# Patient Record
Sex: MALE | Race: WHITE | NOT HISPANIC OR LATINO | Employment: FULL TIME | URBAN - METROPOLITAN AREA
[De-identification: names, ages, dates, MRNs, and addresses within clinical notes are randomized per-mention and may not be internally consistent; named-entity substitution may affect disease eponyms.]

---

## 2019-02-22 ENCOUNTER — HOSPITAL ENCOUNTER (OUTPATIENT)
Facility: MEDICAL CENTER | Age: 59
End: 2019-02-22
Attending: PREVENTIVE MEDICINE
Payer: COMMERCIAL

## 2019-02-22 ENCOUNTER — EH NON-PROVIDER (OUTPATIENT)
Dept: OCCUPATIONAL MEDICINE | Facility: CLINIC | Age: 59
End: 2019-02-22

## 2019-02-22 ENCOUNTER — EMPLOYEE HEALTH (OUTPATIENT)
Dept: OCCUPATIONAL MEDICINE | Facility: CLINIC | Age: 59
End: 2019-02-22

## 2019-02-22 VITALS
RESPIRATION RATE: 14 BRPM | WEIGHT: 252 LBS | DIASTOLIC BLOOD PRESSURE: 86 MMHG | HEART RATE: 75 BPM | OXYGEN SATURATION: 97 % | SYSTOLIC BLOOD PRESSURE: 132 MMHG | HEIGHT: 74 IN | BODY MASS INDEX: 32.34 KG/M2 | TEMPERATURE: 97.7 F

## 2019-02-22 VITALS
DIASTOLIC BLOOD PRESSURE: 86 MMHG | HEART RATE: 75 BPM | OXYGEN SATURATION: 97 % | WEIGHT: 252 LBS | BODY MASS INDEX: 32.34 KG/M2 | TEMPERATURE: 97.7 F | SYSTOLIC BLOOD PRESSURE: 132 MMHG | RESPIRATION RATE: 14 BRPM | HEIGHT: 74 IN

## 2019-02-22 DIAGNOSIS — Z02.89 ENCOUNTER FOR OCCUPATIONAL HEALTH EXAMINATION: ICD-10-CM

## 2019-02-22 DIAGNOSIS — Z02.1 PRE-EMPLOYMENT DRUG SCREENING: ICD-10-CM

## 2019-02-22 LAB
AMP AMPHETAMINE: NORMAL
BAR BARBITURATES: NORMAL
BZO BENZODIAZEPINES: NORMAL
COC COCAINE: NORMAL
INT CON NEG: NORMAL
INT CON POS: NORMAL
MDMA ECSTASY: NORMAL
MET METHAMPHETAMINES: NORMAL
MTD METHADONE: NORMAL
OPI OPIATES: NORMAL
OXY OXYCODONE: NORMAL
PCP PHENCYCLIDINE: NORMAL
POC URINE DRUG SCREEN OCDRS: NEGATIVE
THC: NORMAL

## 2019-02-22 PROCEDURE — 86735 MUMPS ANTIBODY: CPT | Performed by: PREVENTIVE MEDICINE

## 2019-02-22 PROCEDURE — 86762 RUBELLA ANTIBODY: CPT | Performed by: PREVENTIVE MEDICINE

## 2019-02-22 PROCEDURE — 86787 VARICELLA-ZOSTER ANTIBODY: CPT | Performed by: PREVENTIVE MEDICINE

## 2019-02-22 PROCEDURE — 86765 RUBEOLA ANTIBODY: CPT | Performed by: PREVENTIVE MEDICINE

## 2019-02-22 PROCEDURE — 8915 PR COMPREHENSIVE PHYSICAL: Performed by: PREVENTIVE MEDICINE

## 2019-02-22 PROCEDURE — 80305 DRUG TEST PRSMV DIR OPT OBS: CPT | Performed by: PREVENTIVE MEDICINE

## 2019-02-22 PROCEDURE — 86480 TB TEST CELL IMMUN MEASURE: CPT | Performed by: PREVENTIVE MEDICINE

## 2019-02-22 RX ORDER — LISINOPRIL 5 MG/1
5 TABLET ORAL
COMMUNITY

## 2019-02-22 RX ORDER — ATORVASTATIN CALCIUM 20 MG/1
20 TABLET, FILM COATED ORAL
COMMUNITY
End: 2019-07-19 | Stop reason: SDUPTHER

## 2019-02-22 RX ORDER — ALLOPURINOL 300 MG/1
300 TABLET ORAL
COMMUNITY

## 2019-02-23 LAB
MEV IGG SER IA-ACNC: 2.51
MUV IGG SER IA-ACNC: 2.95
RUBV AB SER QL: >500 IU/ML
VZV IGG SER IA-ACNC: 2

## 2019-02-25 LAB
GAMMA INTERFERON BACKGROUND BLD IA-ACNC: 0.02 IU/ML
M TB IFN-G BLD-IMP: NEGATIVE
M TB IFN-G CD4+ BCKGRND COR BLD-ACNC: 0 IU/ML
MITOGEN IGNF BCKGRD COR BLD-ACNC: >10 IU/ML
QFT TB2 - NIL TBQ2: 0.01 IU/ML

## 2019-02-27 ENCOUNTER — TELEPHONE (OUTPATIENT)
Dept: OCCUPATIONAL MEDICINE | Facility: CLINIC | Age: 59
End: 2019-02-27

## 2019-03-05 ENCOUNTER — TELEPHONE (OUTPATIENT)
Dept: OCCUPATIONAL MEDICINE | Facility: CLINIC | Age: 59
End: 2019-03-05

## 2019-03-07 ENCOUNTER — TELEPHONE (OUTPATIENT)
Dept: OCCUPATIONAL MEDICINE | Facility: CLINIC | Age: 59
End: 2019-03-07

## 2019-06-18 ENCOUNTER — OFFICE VISIT (OUTPATIENT)
Dept: MEDICAL GROUP | Facility: MEDICAL CENTER | Age: 59
End: 2019-06-18
Payer: COMMERCIAL

## 2019-06-18 VITALS
DIASTOLIC BLOOD PRESSURE: 60 MMHG | OXYGEN SATURATION: 96 % | BODY MASS INDEX: 32.26 KG/M2 | WEIGHT: 251.4 LBS | HEART RATE: 68 BPM | TEMPERATURE: 98.4 F | SYSTOLIC BLOOD PRESSURE: 98 MMHG | HEIGHT: 74 IN

## 2019-06-18 DIAGNOSIS — F33.42 RECURRENT MAJOR DEPRESSIVE DISORDER, IN FULL REMISSION (HCC): ICD-10-CM

## 2019-06-18 DIAGNOSIS — L71.9 ROSACEA: ICD-10-CM

## 2019-06-18 DIAGNOSIS — E11.69 DIABETES MELLITUS TYPE 2 IN OBESE (HCC): ICD-10-CM

## 2019-06-18 DIAGNOSIS — I10 ESSENTIAL HYPERTENSION: ICD-10-CM

## 2019-06-18 DIAGNOSIS — G47.10 EXCESSIVE SLEEPINESS: ICD-10-CM

## 2019-06-18 DIAGNOSIS — E66.9 DIABETES MELLITUS TYPE 2 IN OBESE (HCC): ICD-10-CM

## 2019-06-18 DIAGNOSIS — M1A.09X0 IDIOPATHIC CHRONIC GOUT OF MULTIPLE SITES WITHOUT TOPHUS: ICD-10-CM

## 2019-06-18 DIAGNOSIS — E66.09 CLASS 1 OBESITY DUE TO EXCESS CALORIES WITHOUT SERIOUS COMORBIDITY WITH BODY MASS INDEX (BMI) OF 32.0 TO 32.9 IN ADULT: ICD-10-CM

## 2019-06-18 PROCEDURE — 99214 OFFICE O/P EST MOD 30 MIN: CPT | Performed by: FAMILY MEDICINE

## 2019-06-18 RX ORDER — CLINDAMYCIN PHOSPHATE 10 MG/G
GEL TOPICAL
Qty: 1 TUBE | Refills: 3 | Status: SHIPPED | OUTPATIENT
Start: 2019-06-18

## 2019-06-18 RX ORDER — BUPROPION HYDROCHLORIDE 100 MG/1
150 TABLET ORAL 2 TIMES DAILY
COMMUNITY

## 2019-06-18 RX ORDER — SERTRALINE HYDROCHLORIDE 100 MG/1
200 TABLET, FILM COATED ORAL DAILY
COMMUNITY

## 2019-06-18 ASSESSMENT — PATIENT HEALTH QUESTIONNAIRE - PHQ9: CLINICAL INTERPRETATION OF PHQ2 SCORE: 0

## 2019-06-18 NOTE — ASSESSMENT & PLAN NOTE
Having a very hard time staying awake at work. He works in a sedentary office job. This has been going on for a couple years, seems to be getting worse recently. +/-  Fatigue, still working out regularly.   Reports about 7-8 hrs of sleep at night. Wakes 1-2 times per night, usually to go the bathroom.   Reports sore, dry throat in the morning  Wife reports a lot of snoring.

## 2019-06-18 NOTE — ASSESSMENT & PLAN NOTE
Chronic problem.  Currently on Lisinopril 5mg.  Blood pressure well controlled, in fact on the low side.  Patient states at home his blood pressure is much higher.

## 2019-06-18 NOTE — ASSESSMENT & PLAN NOTE
The patient is seen by a psychiatrist in [place] once every 4 months. Currently very well controlled on sertraline and wellbutrin.

## 2019-06-20 PROBLEM — M1A.0790 CHRONIC IDIOPATHIC GOUT INVOLVING TOE: Status: ACTIVE | Noted: 2019-06-18

## 2019-06-20 NOTE — PROGRESS NOTES
Subjective:     CC:  Diagnoses of Diabetes mellitus type 2 in obese (HCC), Idiopathic chronic gout of multiple sites without tophus, Essential hypertension, Class 1 obesity due to excess calories without serious comorbidity with body mass index (BMI) of 32.0 to 32.9 in adult, Recurrent major depressive disorder, in full remission ([facility]), Excessive sleepiness, and Rosacea were pertinent to this visit.    HISTORY OF THE PRESENT ILLNESS: Patient is a 59 y.o. male who presents to the clinic today to establish care.  He recently moved here from the [place] area.    Essential hypertension  Chronic problem.  Currently on Lisinopril 5mg.  Blood pressure well controlled, in fact on the low side.  Patient states at home his blood pressure is much higher.    Recurrent major depressive disorder, in full remission ([facility])  The patient is seen by a psychiatrist in [place] once every 4 months. Currently very well controlled on sertraline and wellbutrin.    Excessive sleepiness  Having a very hard time staying awake at work. He works in a sedentary office job. This has been going on for a couple years, seems to be getting worse recently. +/-  Fatigue, still working out regularly.   Reports about 7-8 hrs of sleep at night. Wakes 1-2 times per night, usually to go the bathroom.   Reports sore, dry throat in the morning  Wife reports a lot of snoring.     Chronic idiopathic gout involving toe  Chronic problem.  On allopurinol x 20 yrs. Last gout flare was 3 yrs ago.     Class 1 obesity due to excess calories without serious comorbidity with body mass index (BMI) of 32.0 to 32.9 in adult  Chronic problem.  Patient states he would like to work on weight loss.    Diabetes mellitus type 2 in obese (HCC)  Chronic problem.  The patient is not currently on any medications for this.  He states he was diagnosed in the past with a different physician.  He reports that his A1c was greater than 6.5.  Unclear why he is not on  metformin.    Rosacea  Chronic problem.  The patient states that he is never been treated for this.  He reports that he has noticed redness around his nose and cheeks for couple years now.  He is interested in treating this.      Allergies: Patient has no known allergies.    Current Outpatient Prescriptions Ordered in [facility]   Medication Sig Dispense Refill   • sertraline (ZOLOFT) 100 MG Tab Take 200 mg by mouth every day.     • buPROPion (WELLBUTRIN) 100 MG Tab Take 150 mg by mouth 2 times a day.     • clindamycin (CLEOCIN T) 1 % Gel Apply small amount to affected skin qhs. 1 Tube 3   • lisinopril (PRINIVIL) 5 MG Tab Take 5 mg by mouth.     • atorvastatin (LIPITOR) 20 MG Tab Take 20 mg by mouth.     • allopurinol (ZYLOPRIM) 300 MG Tab Take 300 mg by mouth.       No current Epic-ordered facility-administered medications on file.        Past Medical History:   Diagnosis Date   • Depression    • Diabetes (HCC)    • Hypertension        Past Surgical History:   Procedure Laterality Date   • ENT SURGERY      (R) ear   • OTHER ORTHOPEDIC SURGERY      Radial Head, (R) wrist   • TONSILLECTOMY         Social History   Substance Use Topics   • Smoking status: Never Smoker   • Smokeless tobacco: Never Used   • Alcohol use No      Comment: Rarely       Social History     Social History Narrative   • No narrative on file       Family History   Problem Relation Age of Onset   • Diabetes Mother    • No Known Problems Father        Health Maintenance: Completed    ROS:   Gen: no fevers/chills, no changes in weight  Eyes: no changes in vision  ENT: no sore throat, no hearing loss, no bloody nose  Pulm: no sob, no cough  CV: no chest pain, no palpitations  GI: no nausea/vomiting, no diarrhea  : no dysuria  MSk: no myalgias  Skin: no rash  Neuro: no headaches, no numbness/tingling  Heme/Lymph: no easy bruising             Objective:       Exam: BP (!) 98/60 (BP Location: Right arm, Patient Position: Sitting, BP Cuff Size: Adult)    "Pulse 68   Temp 36.9 °C (98.4 °F) (Temporal)   Ht 1.88 m (6' 2\")   Wt 114 kg (251 lb 6.4 oz)   SpO2 96%  Body mass index is 32.28 kg/m².    General: Normal appearing. No distress.  HEENT: conjunctiva clear, lids without ptosis, pupils equal  and reactive to light, ears normal shape and contour, canals are clear bilaterally, TMs clear, oropharynx is without erythema, edema or exudates.   Neck: Supple without masses. Thyroid is not enlarged.  Pulmonary: Clear to ausculation.  Normal effort. No rales, ronchi, or wheezing.  Cardiovascular: Regular rate and rhythm, no murmur. No LE edema  Abdomen: Soft, nontender, nondistended. No masses or hernias noted.  Neurologic: Grossly normal, no focal deficits  Lymph: No cervical or supraclavicular lymph nodes are palpable  Skin: Warm and dry.  No obvious lesions.  Musculoskeletal: Normal gait and station.  Psych: Normal mood and affect. Alert and oriented x3. Judgment and insight is normal.      Assessment & Plan:   59 y.o. male with the following -    1. Diabetes mellitus type 2 in obese (HCC)  Chronic problem, new to discuss.  The patient states that he was diagnosed with type 2 diabetes, however, he is not on any hypoglycemic medications.  He states his A1c was greater than 6.5 in the past.  Try to work on diet and exercise.  Ordered the following labs.  - Comp Metabolic Panel; Future  - CBC WITHOUT DIFFERENTIAL; Future    2. Idiopathic chronic gout of multiple sites without tophus  Chronic problem, new to discuss.  No gout flares for many years.  Plan to continue on current medication.  - Comp Metabolic Panel; Future    3. Essential hypertension  Chronic problem, new to discuss.  Blood pressure is currently well controlled with 5 mg of lisinopril.  In fact, blood pressure was on the low side today, however, the patient states that his blood pressure runs in the 120s to 130 systolic at home.  - Comp Metabolic Panel; Future  - Lipid Profile; Future  - TSH WITH REFLEX TO " FT4; Future  - CBC WITHOUT DIFFERENTIAL; Future    4. Class 1 obesity due to excess calories without serious comorbidity with body mass index (BMI) of 32.0 to 32.9 in adult  Chronic problem, new to discuss.  Discussed diet and exercise briefly today.  Ordered the following labs.  Will discuss further at next appointment.  - Comp Metabolic Panel; Future  - Lipid Profile; Future  - TSH WITH REFLEX TO FT4; Future    5. Recurrent major depressive disorder, in full remission (HCC)  Chronic problem, new to discuss.  Well-controlled.  The patient sees a psychiatrist in [place].  He is currently on Wellbutrin and Zoloft.  He does not need a refill today.    6. Excessive sleepiness  New problem.  Patient reports excessive sleepiness.  He denies significant fatigue throughout the day.  He wakes up 1-2 times per night.  Concern for disordered sleep, referral placed for sleep study.  Ordered the following labs.  - REFERRAL TO SLEEP STUDIES  - TSH WITH REFLEX TO FT4; Future  - CBC WITHOUT DIFFERENTIAL; Future    7. Rosacea  New problem.  Prescription given for Clindagel.  Plan to follow-up in 1 month.  - clindamycin (CLEOCIN T) 1 % Gel; Apply small amount to affected skin qhs.  Dispense: 1 Tube; Refill: 3      Return in about 4 weeks (around 7/16/2019) for f/u labs.    Please note that this dictation was created using voice recognition software. I have made every reasonable attempt to correct obvious errors, but I expect that there are errors of grammar and possibly content that I did not discover before finalizing the note.

## 2019-06-20 NOTE — ASSESSMENT & PLAN NOTE
Chronic problem.  The patient is not currently on any medications for this.  He states he was diagnosed in the past with a different physician.  He reports that his A1c was greater than 6.5.  Unclear why he is not on metformin.

## 2019-06-20 NOTE — ASSESSMENT & PLAN NOTE
Chronic problem.  The patient states that he is never been treated for this.  He reports that he has noticed redness around his nose and cheeks for couple years now.  He is interested in treating this.

## 2019-06-27 ENCOUNTER — OFFICE VISIT (OUTPATIENT)
Dept: URGENT CARE | Facility: CLINIC | Age: 59
End: 2019-06-27
Payer: COMMERCIAL

## 2019-06-27 VITALS
WEIGHT: 251 LBS | HEART RATE: 57 BPM | BODY MASS INDEX: 32.21 KG/M2 | RESPIRATION RATE: 14 BRPM | OXYGEN SATURATION: 94 % | DIASTOLIC BLOOD PRESSURE: 76 MMHG | SYSTOLIC BLOOD PRESSURE: 120 MMHG | TEMPERATURE: 97.2 F | HEIGHT: 74 IN

## 2019-06-27 DIAGNOSIS — J01.00 ACUTE NON-RECURRENT MAXILLARY SINUSITIS: ICD-10-CM

## 2019-06-27 DIAGNOSIS — H66.001 ACUTE SUPPURATIVE OTITIS MEDIA OF RIGHT EAR WITHOUT SPONTANEOUS RUPTURE OF TYMPANIC MEMBRANE, RECURRENCE NOT SPECIFIED: ICD-10-CM

## 2019-06-27 PROCEDURE — 99214 OFFICE O/P EST MOD 30 MIN: CPT | Performed by: PHYSICIAN ASSISTANT

## 2019-06-27 RX ORDER — AMOXICILLIN AND CLAVULANATE POTASSIUM 875; 125 MG/1; MG/1
TABLET, FILM COATED ORAL
Qty: 14 TAB | Refills: 0 | Status: SHIPPED | OUTPATIENT
Start: 2019-06-27 | End: 2019-11-04

## 2019-06-27 ASSESSMENT — ENCOUNTER SYMPTOMS
CHILLS: 0
FEVER: 0
SINUS PRESSURE: 1
SORE THROAT: 1
COUGH: 1

## 2019-06-28 NOTE — PROGRESS NOTES
"Subjective:      Alexsander Doherty is a 59 y.o. male who presents with Sinus Problem (drainage (R) ear, dark mucus disharge x8days )            Sinus Problem   This is a new problem. The current episode started in the past 7 days. The problem has been gradually worsening since onset. There has been no fever. His pain is at a severity of 5/10. The pain is moderate. Associated symptoms include congestion, coughing, ear pain, sinus pressure and a sore throat. Pertinent negatives include no chills. (Right ear drainage  ) Past treatments include nothing. The treatment provided no relief.     Past medical history: Is not pertinent to this examination  Past surgical history: Not pertinent to this examination  Family history: Is not pertinent to this examination  Allergies: No known drug allergies  Social history: Is reviewed in [facility]  Current Outpatient Prescriptions on File Prior to Visit   Medication Sig Dispense Refill   • sertraline (ZOLOFT) 100 MG Tab Take 200 mg by mouth every day.     • buPROPion (WELLBUTRIN) 100 MG Tab Take 150 mg by mouth 2 times a day.     • clindamycin (CLEOCIN T) 1 % Gel Apply small amount to affected skin qhs. 1 Tube 3   • lisinopril (PRINIVIL) 5 MG Tab Take 5 mg by mouth.     • atorvastatin (LIPITOR) 20 MG Tab Take 20 mg by mouth.     • allopurinol (ZYLOPRIM) 300 MG Tab Take 300 mg by mouth.       No current facility-administered medications on file prior to visit.        Review of Systems   Constitutional: Negative for chills and fever.   HENT: Positive for congestion, ear discharge, ear pain, sinus pressure and sore throat.    Respiratory: Positive for cough.    Cardiovascular: Negative for chest pain.   All other systems reviewed and are negative.         Objective:     /76   Pulse (!) 57   Temp 36.2 °C (97.2 °F)   Resp 14   Ht 1.88 m (6' 2\")   Wt 113.9 kg (251 lb)   SpO2 94%   BMI 32.23 kg/m²      Physical Exam       Gen.: Patient is A and O ×3, no acute distress, well-nourished " well-hydrated  Vitals: Are listed and unremarkable  HEENT: Heads normocephalic, atraumatic, PERRLA, extraocular movements intact, right TM is bulging with fluid behind it.  Do not see an area of rupture but there is yellow fluid behind it.  And oropharynx clear.  Bilateral pain to percussion in the maxillary sinus region.  Nares are slightly edematous but not erythematous  Neck: Soft supple without cervical lymphadenopathy  Cardiovascular: Regular rate and rhythm normal S1 and S2. No murmurs, rubs or gallops  Lungs are clear to auscultation bilaterally. no wheezes rales or rhonchi  Abdomen is soft, nontender, nondistended with good bowel sounds, no hepatosplenomegaly  Skin: Is well perfused without evidence of rash or lesions  Neurological:  cranial nerves II through XII were assessed and intact.  Musculoskeletal: Full range of motion, 5 out of 5 strength against resistance  Neurovascularly: Intact with a 2 second cap refill, good distal pulses       Assessment/Plan:     1. Acute non-recurrent maxillary sinusitis  Take Allegra-D daily for the next week  - amoxicillin-clavulanate (AUGMENTIN) 875-125 MG Tab; Take one pill twice a day with food for a week  Dispense: 14 Tab; Refill: 0    2. Acute suppurative otitis media of right ear without spontaneous rupture of tympanic membrane, recurrence not specified  Take Allegra-D daily for the next week.  Follow-up if symptoms persist or worsen despite treatment  - amoxicillin-clavulanate (AUGMENTIN) 875-125 MG Tab; Take one pill twice a day with food for a week  Dispense: 14 Tab; Refill: 0

## 2019-07-16 ENCOUNTER — HOSPITAL ENCOUNTER (OUTPATIENT)
Dept: LAB | Facility: MEDICAL CENTER | Age: 59
End: 2019-07-16
Attending: FAMILY MEDICINE
Payer: COMMERCIAL

## 2019-07-16 DIAGNOSIS — M1A.09X0 IDIOPATHIC CHRONIC GOUT OF MULTIPLE SITES WITHOUT TOPHUS: ICD-10-CM

## 2019-07-16 DIAGNOSIS — E11.69 DIABETES MELLITUS TYPE 2 IN OBESE (HCC): ICD-10-CM

## 2019-07-16 DIAGNOSIS — E66.09 CLASS 1 OBESITY DUE TO EXCESS CALORIES WITHOUT SERIOUS COMORBIDITY WITH BODY MASS INDEX (BMI) OF 32.0 TO 32.9 IN ADULT: ICD-10-CM

## 2019-07-16 DIAGNOSIS — E66.9 DIABETES MELLITUS TYPE 2 IN OBESE (HCC): ICD-10-CM

## 2019-07-16 DIAGNOSIS — I10 ESSENTIAL HYPERTENSION: ICD-10-CM

## 2019-07-16 DIAGNOSIS — G47.10 EXCESSIVE SLEEPINESS: ICD-10-CM

## 2019-07-16 LAB
ALBUMIN SERPL BCP-MCNC: 4.5 G/DL (ref 3.2–4.9)
ALBUMIN/GLOB SERPL: 1.7 G/DL
ALP SERPL-CCNC: 60 U/L (ref 30–99)
ALT SERPL-CCNC: 28 U/L (ref 2–50)
ANION GAP SERPL CALC-SCNC: 10 MMOL/L (ref 0–11.9)
AST SERPL-CCNC: 21 U/L (ref 12–45)
BILIRUB SERPL-MCNC: 0.5 MG/DL (ref 0.1–1.5)
BUN SERPL-MCNC: 20 MG/DL (ref 8–22)
CALCIUM SERPL-MCNC: 9.4 MG/DL (ref 8.5–10.5)
CHLORIDE SERPL-SCNC: 101 MMOL/L (ref 96–112)
CHOLEST SERPL-MCNC: 216 MG/DL (ref 100–199)
CO2 SERPL-SCNC: 26 MMOL/L (ref 20–33)
CREAT SERPL-MCNC: 1.18 MG/DL (ref 0.5–1.4)
ERYTHROCYTE [DISTWIDTH] IN BLOOD BY AUTOMATED COUNT: 43.7 FL (ref 35.9–50)
FASTING STATUS PATIENT QL REPORTED: NORMAL
GLOBULIN SER CALC-MCNC: 2.6 G/DL (ref 1.9–3.5)
GLUCOSE SERPL-MCNC: 151 MG/DL (ref 65–99)
HCT VFR BLD AUTO: 42.2 % (ref 42–52)
HDLC SERPL-MCNC: 35 MG/DL
HGB BLD-MCNC: 13 G/DL (ref 14–18)
LDLC SERPL CALC-MCNC: ABNORMAL MG/DL
MCH RBC QN AUTO: 26.7 PG (ref 27–33)
MCHC RBC AUTO-ENTMCNC: 30.8 G/DL (ref 33.7–35.3)
MCV RBC AUTO: 86.8 FL (ref 81.4–97.8)
PLATELET # BLD AUTO: 247 K/UL (ref 164–446)
PMV BLD AUTO: 9.5 FL (ref 9–12.9)
POTASSIUM SERPL-SCNC: 4.5 MMOL/L (ref 3.6–5.5)
PROT SERPL-MCNC: 7.1 G/DL (ref 6–8.2)
RBC # BLD AUTO: 4.86 M/UL (ref 4.7–6.1)
SODIUM SERPL-SCNC: 137 MMOL/L (ref 135–145)
TRIGL SERPL-MCNC: 477 MG/DL (ref 0–149)
TSH SERPL DL<=0.005 MIU/L-ACNC: 1.94 UIU/ML (ref 0.38–5.33)
WBC # BLD AUTO: 5.5 K/UL (ref 4.8–10.8)

## 2019-07-16 PROCEDURE — 84443 ASSAY THYROID STIM HORMONE: CPT

## 2019-07-16 PROCEDURE — 36415 COLL VENOUS BLD VENIPUNCTURE: CPT

## 2019-07-16 PROCEDURE — 80061 LIPID PANEL: CPT

## 2019-07-16 PROCEDURE — 80053 COMPREHEN METABOLIC PANEL: CPT

## 2019-07-16 PROCEDURE — 85027 COMPLETE CBC AUTOMATED: CPT

## 2019-07-19 ENCOUNTER — OFFICE VISIT (OUTPATIENT)
Dept: MEDICAL GROUP | Facility: MEDICAL CENTER | Age: 59
End: 2019-07-19
Payer: COMMERCIAL

## 2019-07-19 VITALS
DIASTOLIC BLOOD PRESSURE: 68 MMHG | WEIGHT: 253.53 LBS | OXYGEN SATURATION: 94 % | BODY MASS INDEX: 32.54 KG/M2 | HEART RATE: 54 BPM | HEIGHT: 74 IN | SYSTOLIC BLOOD PRESSURE: 102 MMHG | TEMPERATURE: 98.4 F

## 2019-07-19 DIAGNOSIS — E66.9 DIABETES MELLITUS TYPE 2 IN OBESE (HCC): ICD-10-CM

## 2019-07-19 DIAGNOSIS — E11.69 DIABETES MELLITUS TYPE 2 IN OBESE (HCC): ICD-10-CM

## 2019-07-19 DIAGNOSIS — H66.011 ACUTE SUPPURATIVE OTITIS MEDIA OF RIGHT EAR WITH SPONTANEOUS RUPTURE OF TYMPANIC MEMBRANE, RECURRENCE NOT SPECIFIED: ICD-10-CM

## 2019-07-19 DIAGNOSIS — L71.9 ROSACEA: ICD-10-CM

## 2019-07-19 DIAGNOSIS — E78.5 DYSLIPIDEMIA: ICD-10-CM

## 2019-07-19 DIAGNOSIS — E11.9 DIABETES MELLITUS WITHOUT COMPLICATION (HCC): ICD-10-CM

## 2019-07-19 LAB
HBA1C MFR BLD: 7.1 % (ref 0–5.6)
INT CON NEG: ABNORMAL
INT CON POS: ABNORMAL

## 2019-07-19 PROCEDURE — 99214 OFFICE O/P EST MOD 30 MIN: CPT | Performed by: FAMILY MEDICINE

## 2019-07-19 PROCEDURE — 83036 HEMOGLOBIN GLYCOSYLATED A1C: CPT | Performed by: FAMILY MEDICINE

## 2019-07-19 RX ORDER — CEFDINIR 300 MG/1
300 CAPSULE ORAL 2 TIMES DAILY
Qty: 20 CAP | Refills: 20 | Status: SHIPPED | OUTPATIENT
Start: 2019-07-19 | End: 2019-11-04

## 2019-07-19 RX ORDER — ATORVASTATIN CALCIUM 80 MG/1
80 TABLET, FILM COATED ORAL DAILY
Qty: 30 TAB | Refills: 5 | Status: SHIPPED | OUTPATIENT
Start: 2019-07-19 | End: 2019-11-04 | Stop reason: SDUPTHER

## 2019-07-19 ASSESSMENT — PATIENT HEALTH QUESTIONNAIRE - PHQ9
6. FEELING BAD ABOUT YOURSELF - OR THAT YOU ARE A FAILURE OR HAVE LET YOURSELF OR YOUR FAMILY DOWN: SEVERAL DAYS
8. MOVING OR SPEAKING SO SLOWLY THAT OTHER PEOPLE COULD HAVE NOTICED. OR THE OPPOSITE, BEING SO FIGETY OR RESTLESS THAT YOU HAVE BEEN MOVING AROUND A LOT MORE THAN USUAL: NOT AT ALL
4. FEELING TIRED OR HAVING LITTLE ENERGY: SEVERAL DAYS
SUM OF ALL RESPONSES TO PHQ9 QUESTIONS 1 AND 2: 2
7. TROUBLE CONCENTRATING ON THINGS, SUCH AS READING THE NEWSPAPER OR WATCHING TELEVISION: MORE THAN HALF THE DAYS
9. THOUGHTS THAT YOU WOULD BE BETTER OFF DEAD, OR OF HURTING YOURSELF: NOT AT ALL
3. TROUBLE FALLING OR STAYING ASLEEP OR SLEEPING TOO MUCH: NOT AT ALL
2. FEELING DOWN, DEPRESSED, IRRITABLE, OR HOPELESS: SEVERAL DAYS
SUM OF ALL RESPONSES TO PHQ QUESTIONS 1-9: 6
5. POOR APPETITE OR OVEREATING: NOT AT ALL
1. LITTLE INTEREST OR PLEASURE IN DOING THINGS: SEVERAL DAYS

## 2019-07-19 NOTE — ASSESSMENT & PLAN NOTE
Chronic problem.  The patient has noted some mild to moderate improvement with the clindamycin gel.  No adverse reactions.

## 2019-07-19 NOTE — PROGRESS NOTES
Subjective:     CC: Diagnoses of Diabetes mellitus without complication (HCC), Dyslipidemia, Acute suppurative otitis media of right ear with spontaneous rupture of tympanic membrane, recurrence not specified, Rosacea, and Diabetes mellitus type 2 in obese (HCC) were pertinent to this visit.    HPI: Patient is a 59 y.o. male established patient who presents today to follow-up.      Rosacea  Chronic problem.  The patient is noted some mild to moderate improvement with the clindamycin gel.  No adverse reactions.    Dyslipidemia  Chronic problem.  Lipid panel showed a severely elevated triglycerides of 477, low HDL at 35, LDL was unable to be calculated due to elevated triglycerides.  Patient is currently on atorvastatin 20 mg.  He reports that he is good about taking this.    Diabetes mellitus type 2 in obese (HCC)  Chronic problem.  Not currently on medications.  The patient has been trying to work on diet and exercise, however, reports that his diet has not been good lately.  His A1c today is 7.1%.  He is on lisinopril 5 mg also on a statin.    Acute suppurative otitis media of right ear with spontaneous rupture of tympanic membrane  Patient was seen about 3 weeks ago for sinusitis.  He was given Augmentin.  He noted his symptoms did improve but then returned.  He then started having discomfort and drainage from the right ear on and off over the past few days.  He denies any fevers or chills.      Past Medical History:   Diagnosis Date   • Depression    • Diabetes (HCC)    • Hypertension        Social History   Substance Use Topics   • Smoking status: Never Smoker   • Smokeless tobacco: Never Used   • Alcohol use No      Comment: Rarely       Current Outpatient Prescriptions Ordered in [facility]   Medication Sig Dispense Refill   • atorvastatin (LIPITOR) 80 MG tablet Take 1 Tab by mouth every day. 30 Tab 5   • metFORMIN (GLUCOPHAGE) 500 MG Tab Take 1 Tab by mouth 2 times a day, with meals. 60 Tab 5   • cefdinir (OMNICEF)  "300 MG Cap Take 1 Cap by mouth 2 times a day. 20 Cap 20   • amoxicillin-clavulanate (AUGMENTIN) 875-125 MG Tab Take one pill twice a day with food for a week 14 Tab 0   • sertraline (ZOLOFT) 100 MG Tab Take 200 mg by mouth every day.     • buPROPion (WELLBUTRIN) 100 MG Tab Take 150 mg by mouth 2 times a day.     • clindamycin (CLEOCIN T) 1 % Gel Apply small amount to affected skin qhs. 1 Tube 3   • lisinopril (PRINIVIL) 5 MG Tab Take 5 mg by mouth.     • allopurinol (ZYLOPRIM) 300 MG Tab Take 300 mg by mouth.       No current Epic-ordered facility-administered medications on file.        Allergies:  Patient has no known allergies.    ROS:  Gen: no fevers/chill, no changes in weight  Eyes: no changes in vision  ENT: no sore throat, no hearing loss, no bloody nose  Pulm: no sob, no cough  CV: no chest pain, no palpitations  GI: no nausea/vomiting, no diarrhea  : no dysuria  MSk: no myalgias  Skin: no rash  Neuro: no headaches, no numbness/tingling  Heme/Lymph: no easy bruising      Objective:       Exam:  /68 (BP Location: Right arm, Patient Position: Sitting, BP Cuff Size: Adult long)   Pulse (!) 54   Temp 36.9 °C (98.4 °F) (Temporal)   Ht 1.88 m (6' 2.02\")   Wt 115 kg (253 lb 8.5 oz)   SpO2 94%   BMI 32.54 kg/m²  Body mass index is 32.54 kg/m².      General: Normal appearing. No distress.  HEAD: NCAT  EYES: conjunctiva clear, lids without ptosis, pupils equal  and reactive to light  EARS: ears normal shape and contour, left TM clear, right TM appears infected, erythema with opaque effusion, purulent drainage noted in the ear canal.  MOUTH: oropharynx is without erythema, edema or exudates.   Neck: Supple without masses. Thyroid is not enlarged. Normal ROM  Pulmonary: Clear to ausculation.  Normal effort. No rales, ronchi, or wheezing.  Cardiovascular: Regular rate and rhythm, no murmur. No LE edema  Neurologic: Grossly normal, no focal deficits  Lymph: No cervical or supraclavicular lymph nodes are " palpable  Skin: Warm and dry.  No obvious lesions.  Musculoskeletal: Normal gait and station.   Psych: Normal mood and affect. Alert and oriented x3. Judgment and insight is normal.      Labs: 7/16/2019 results reviewed and discussed with the patient, questions answered.    Assessment & Plan:     59 y.o. male with the following -     1. Diabetes mellitus without complication (HCC)  Chronic problem.  A1c is worsening, 7.1% today.  The patient is having a hard time controlling his diet.  We will plan to start metformin.  We will plan to repeat an A1c in 3 months.  Monofilament exam today was normal.  The patient is going to work on diet over the next 3 months as he would like to build to come back off the metformin.  - POCT Hemoglobin A1C  - Diabetic Monofilament LE Exam  - atorvastatin (LIPITOR) 80 MG tablet; Take 1 Tab by mouth every day.  Dispense: 30 Tab; Refill: 5  - metFORMIN (GLUCOPHAGE) 500 MG Tab; Take 1 Tab by mouth 2 times a day, with meals.  Dispense: 60 Tab; Refill: 5    2. Dyslipidemia  Chronic problem, uncontrolled.  Lipid panel was severely elevated, unable to measure LDL due to triglyceride of 477.  This is on atorvastatin 20 mg.  We will plan to increase his atorvastatin to 80 mg.  We will plan to repeat lipid panel in about 3 months.  - atorvastatin (LIPITOR) 80 MG tablet; Take 1 Tab by mouth every day.  Dispense: 30 Tab; Refill: 5  - Lipid Profile; Future    3. Acute suppurative otitis media of right ear with spontaneous rupture of tympanic membrane, recurrence not specified  New problem.  Otitis media noted on the right ear, perforation noted as well.  Plan to treat with Ceftin ear given that he was on Augmentin about 2 weeks ago for sinus infection.  - cefdinir (OMNICEF) 300 MG Cap; Take 1 Cap by mouth 2 times a day.  Dispense: 20 Cap; Refill: 20    4. Rosacea  Chronic problem, improving slightly with Clinda.      Return in about 3 months (around 10/19/2019).    Please note that this dictation  was created using voice recognition software. I have made every reasonable attempt to correct obvious errors, but I expect that there are errors of grammar and possibly content that I did not discover before finalizing the note.

## 2019-08-06 ENCOUNTER — OFFICE VISIT (OUTPATIENT)
Dept: MEDICAL GROUP | Facility: MEDICAL CENTER | Age: 59
End: 2019-08-06
Payer: COMMERCIAL

## 2019-08-06 VITALS
HEIGHT: 74 IN | TEMPERATURE: 97.3 F | SYSTOLIC BLOOD PRESSURE: 88 MMHG | WEIGHT: 252.43 LBS | HEART RATE: 58 BPM | BODY MASS INDEX: 32.4 KG/M2 | DIASTOLIC BLOOD PRESSURE: 52 MMHG | OXYGEN SATURATION: 95 %

## 2019-08-06 DIAGNOSIS — H66.011 ACUTE SUPPURATIVE OTITIS MEDIA OF RIGHT EAR WITH SPONTANEOUS RUPTURE OF TYMPANIC MEMBRANE, RECURRENCE NOT SPECIFIED: ICD-10-CM

## 2019-08-06 DIAGNOSIS — B35.1 ONYCHOMYCOSIS: ICD-10-CM

## 2019-08-06 DIAGNOSIS — M79.675 GREAT TOE PAIN, LEFT: ICD-10-CM

## 2019-08-06 DIAGNOSIS — M25.561 ACUTE PAIN OF RIGHT KNEE: ICD-10-CM

## 2019-08-06 PROCEDURE — 99214 OFFICE O/P EST MOD 30 MIN: CPT | Performed by: FAMILY MEDICINE

## 2019-08-06 RX ORDER — CIPROFLOXACIN AND DEXAMETHASONE 3; 1 MG/ML; MG/ML
4 SUSPENSION/ DROPS AURICULAR (OTIC) 2 TIMES DAILY
Qty: 1 BOTTLE | Refills: 0 | Status: SHIPPED | OUTPATIENT
Start: 2019-08-06 | End: 2019-08-13

## 2019-08-06 RX ORDER — LEVOFLOXACIN 500 MG/1
500 TABLET, FILM COATED ORAL DAILY
Qty: 7 TAB | Refills: 0 | Status: SHIPPED | OUTPATIENT
Start: 2019-08-06 | End: 2019-08-13

## 2019-08-06 NOTE — ASSESSMENT & PLAN NOTE
New problem for the past few days. States he has pain in the left great toe deep to the nail when there is pressure placed on the nail. He does have fungal infection. He denies any injury to the nail or toe. No bruising. No ingrown toenail noted.

## 2019-08-06 NOTE — PROGRESS NOTES
Subjective:     CC: Diagnoses of Acute suppurative otitis media of right ear with spontaneous rupture of tympanic membrane, recurrence not specified, Acute pain of right knee, Great toe pain, left, and Onychomycosis were pertinent to this visit.    HPI: Patient is a 59 y.o. male established patient who presents today for f/u on AOM with perforation and new concern of knee pain and toe pain.      Acute suppurative otitis media of right ear with spontaneous rupture of tympanic membrane  Chronic problem. Continues to have drainage daily since last visit. No improvement with Cefdinir. No pain. No fevers. Finished the cefdinir.   History of recurrent infections as a child. H/o mastoidectomy as a child. 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  Has had a couple ear infections since then with drainage in the past and improvement noted with ear drops in the past.     Acute pain of right knee  New problem. Started a couple months ago. No injury that he knows of. Reports pain only with going up stairs or lifting off of that leg. Never had pain like this before. No clicking, no locking, no instability. No issues going down stairs or on a flat surface.     Great toe pain, left  New problem for the past few days. States he has pain in the left great toe deep to the nail when there is pressure placed on the nail. He does have fungal infection. He denies any injury to the nail or toe. No bruising. No ingrown toenail noted.       Past Medical History:   Diagnosis Date   • Depression    • Diabetes (HCC)    • Hypertension        Social History     Tobacco Use   • Smoking status: Never Smoker   • Smokeless tobacco: Never Used   Substance Use Topics   • Alcohol use: No     Comment: Rarely   • Drug use: No       Current Outpatient Medications Ordered in Epic   Medication Sig Dispense Refill   • ciprofloxacin/dexamethasone (CIPRODEX) 0.3-0.1 % Suspension Place 4 Drops in ear 2 times a day for 7  "days. 1 Bottle 0   • levoFLOXacin (LEVAQUIN) 500 MG tablet Take 1 Tab by mouth every day for 7 days. 7 Tab 0   • atorvastatin (LIPITOR) 80 MG tablet Take 1 Tab by mouth every day. 30 Tab 5   • metFORMIN (GLUCOPHAGE) 500 MG Tab Take 1 Tab by mouth 2 times a day, with meals. 60 Tab 5   • sertraline (ZOLOFT) 100 MG Tab Take 200 mg by mouth every day.     • buPROPion (WELLBUTRIN) 100 MG Tab Take 150 mg by mouth 2 times a day.     • clindamycin (CLEOCIN T) 1 % Gel Apply small amount to affected skin qhs. 1 Tube 3   • lisinopril (PRINIVIL) 5 MG Tab Take 5 mg by mouth.     • allopurinol (ZYLOPRIM) 300 MG Tab Take 300 mg by mouth.     • cefdinir (OMNICEF) 300 MG Cap Take 1 Cap by mouth 2 times a day. (Patient not taking: Reported on 8/6/2019) 20 Cap 20   • amoxicillin-clavulanate (AUGMENTIN) 875-125 MG Tab Take one pill twice a day with food for a week (Patient not taking: Reported on 8/6/2019) 14 Tab 0     No current Epic-ordered facility-administered medications on file.        Allergies:  Patient has no known allergies.    ROS:  Pulm: no sob, no cough  CV: no chest pain, no palpitations      Objective:       Exam:  BP (!) 88/52 (BP Location: Right arm, Patient Position: Sitting, BP Cuff Size: Adult long)   Pulse (!) 58   Temp 36.3 °C (97.3 °F) (Temporal)   Ht 1.88 m (6' 2.02\")   Wt 114.5 kg (252 lb 6.8 oz)   SpO2 95%   BMI 32.40 kg/m²  Body mass index is 32.4 kg/m².    General: Normal appearing. No distress.  HEAD: NCAT  EYES: conjunctiva clear, lids without ptosis, pupils equal and reactive to light  EARS: ears normal shape and contour. L TM clear. R TM with purulent effusion and small perforation noted.   MOUTH: normal dentition   Neck:  Normal ROM  Pulmonary: Normal effort. Normal respiratory rate.  Cardiovascular: Well perfused. No LE edema  Neurologic: Grossly normal, no focal deficits  Skin: Warm and dry.  No obvious lesions.  Musculoskeletal: Normal gait and station. Tenderness to palpation on the top of the " left great toe.   Psych: Normal mood and affect. Alert and oriented x3. Judgment and insight is normal.    .    Assessment & Plan:     59 y.o. male with the following -     1. Acute suppurative otitis media of right ear with spontaneous rupture of tympanic membrane, recurrence not specified  Chronic problem, uncontrolled. Continues to have infection with noted perforation on exam. Rx for ciprodex and levaquin ordered. Plan to f/u in 1 week. Referral placed to ENT given that he has required TM graft in the past.   - REFERRAL TO ENT  - ciprofloxacin/dexamethasone (CIPRODEX) 0.3-0.1 % Suspension; Place 4 Drops in ear 2 times a day for 7 days.  Dispense: 1 Bottle; Refill: 0  - levoFLOXacin (LEVAQUIN) 500 MG tablet; Take 1 Tab by mouth every day for 7 days.  Dispense: 7 Tab; Refill: 0    2. Acute pain of right knee  New problem. Only with lift off. Ordered the following.   - DX-KNEE COMPLETE 4+ RIGHT; Future  - REFERRAL TO PHYSICAL THERAPY Reason for Therapy: Eval/Treat/Report    3. Great toe pain, left  New problem. Onychomycosis noted on exam and thickening of the nail. No other concerning findings to explain the tenderness. Referral placed to podiatry for possible thinning of the nail.   - REFERRAL TO PODIATRY    4. Onychomycosis  New problem. See above.   - REFERRAL TO PODIATRY      Return in about 1 week (around 8/13/2019).    Please note that this dictation was created using voice recognition software. I have made every reasonable attempt to correct obvious errors, but I expect that there are errors of grammar and possibly content that I did not discover before finalizing the note.

## 2019-08-06 NOTE — ASSESSMENT & PLAN NOTE
Chronic problem. Continues to have drainage daily since last visit. No improvement with Cefdinir. No pain. No fevers. Finished the cefdinir.   History of recurrent infections as a child. H/o mastoidectomy as a child. 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  Has had a couple ear infections since then with drainage in the past and improvement noted with ear drops in the past.

## 2019-08-06 NOTE — ASSESSMENT & PLAN NOTE
New problem. Started a couple months ago. No injury that he knows of. Reports pain only with going up stairs or lifting off of that leg. Never had pain like this before. No clicking, no locking, no instability. No issues going down stairs or on a flat surface.

## 2019-08-09 ENCOUNTER — HOSPITAL ENCOUNTER (OUTPATIENT)
Dept: RADIOLOGY | Facility: MEDICAL CENTER | Age: 59
End: 2019-08-09
Attending: FAMILY MEDICINE
Payer: COMMERCIAL

## 2019-08-09 ENCOUNTER — PHYSICAL THERAPY (OUTPATIENT)
Dept: PHYSICAL THERAPY | Facility: REHABILITATION | Age: 59
End: 2019-08-09
Attending: FAMILY MEDICINE
Payer: COMMERCIAL

## 2019-08-09 DIAGNOSIS — M25.561 ACUTE PAIN OF RIGHT KNEE: ICD-10-CM

## 2019-08-09 DIAGNOSIS — S46.912A STRAIN OF LEFT SHOULDER, INITIAL ENCOUNTER: ICD-10-CM

## 2019-08-09 PROCEDURE — 97162 PT EVAL MOD COMPLEX 30 MIN: CPT

## 2019-08-09 PROCEDURE — 97140 MANUAL THERAPY 1/> REGIONS: CPT

## 2019-08-09 PROCEDURE — 97014 ELECTRIC STIMULATION THERAPY: CPT

## 2019-08-09 PROCEDURE — 73564 X-RAY EXAM KNEE 4 OR MORE: CPT | Mod: RT

## 2019-08-09 ASSESSMENT — ENCOUNTER SYMPTOMS
PAIN SCALE AT LOWEST: 0
PAIN SCALE: 0
PAIN SCALE AT HIGHEST: 9

## 2019-08-09 NOTE — OP THERAPY EVALUATION
Outpatient Physical Therapy  INITIAL EVALUATION    [facility] Physical [address].  [address]  [place] [address]  Phone:  [phone]  Fax:  [phone]    Date of Evaluation: 2019    Patient: Alexsander Doherty  YOB: 1960  MRN: [id number]     Referring Provider: Veronica Gross M.D.  [address] [facility]  [address]  [place] [address]   Referring Diagnosis Acute pain of right knee [M25.561]     Time Calculation  Start time: 215  Stop time: 315 Time Calculation (min): 60 minutes       Physical Therapy Occurrence Codes    Date of onset of impairment:  19   Date physical therapy care plan established or reviewed:  19   Date physical therapy treatment started:  19          Chief Complaint: Knee Pain    Visit Diagnoses     ICD-10-CM   1. Strain of left shoulder, initial encounter S46.912A         Subjective   History of Present Illness:     History of chief complaint:  Patient denies TRINIDAD but reports progressive pain with climbing stairs over the past 4 months.  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    Pain:     Current pain ratin    At best pain ratin    At worst pain ratin    Location:  Medial joint line and proximal tibia    Aggravating factors:  Up fron low chair  Stair going up more than down        Past Medical History:   Diagnosis Date   • Depression    • Diabetes (HCC)    • Hypertension      Past Surgical History:   Procedure Laterality Date   • ENT SURGERY      (R) ear   • OTHER ORTHOPEDIC SURGERY      Radial Head, (R) wrist   • TONSILLECTOMY         Precautions:       Objective   Observation and functional movement:  Heel off early with partial squat    Range of motion and strength:    Squat 80 pain--bilateral heel lift and anterior wt. Shift  Early heel-off with partial squat  All resisted muslcltests were strong and  non-painful    Palpation and joint mobility:     ttp pes ansirine            Therapeutic Treatments and Modalities:     Therapeutic Treatment and Modalities Summary: Cupping and stripping pes anserine   Micro-current (-)  130 ma x 15' mhp    Squat past 90 --less pain    **no needling today due to inner ear infection and finishing course of antibiotics    Time-based treatments/modalities:  Manual therapy minutes (CPT 04233): 8 minutes       Assessment, Response and Plan:   Assessment details:  Patient presents with pain upon loaded knee flexion that is painful with squatting and going up stairs.  All ligament tests, manual muscle test and patellar ballottement test  with wnl and non-painful.  Patient present with severe ttp pes ansirine.  Patient should do well for goal if [id number] with POC  Prognosis: good    Goals:   Short Term Goals:   Up 10 stairs w/o use of railing  Squat past 90 degrees w.o pain  WOMAC <12%  Short term goal time span:  2-4 weeks      Long Term Goals:    Up/down 20 steps  w/o use of railing  fuil squat w/o  pain  WOMAC <5%  Long term goal time span:  6-8 weeks    Plan:   Therapy options:  Physical therapy treatment to continue  Planned therapy interventions:  Manual Therapy (CPT 62753), E Stim Attended (CPT 38806) and Therapeutic Exercise (CPT 82683)  Other planned therapy interventions:  Sry needle  Frequency:  2x week  Duration in weeks:  8  Duration in visits:  10  Plan details:  Psot chain strength, iastm cupping DN      Functional Assessment Used  PT Functional Assessment Tool Used: womac  PT Functional Assessment Score: 19% disability     Referring provider co-signature:  I have reviewed this plan of care and my co-signature certifies the need for services.      Physician Signature: ________________________________ Date: ______________

## 2019-08-13 ENCOUNTER — OFFICE VISIT (OUTPATIENT)
Dept: MEDICAL GROUP | Facility: MEDICAL CENTER | Age: 59
End: 2019-08-13
Payer: COMMERCIAL

## 2019-08-13 VITALS
SYSTOLIC BLOOD PRESSURE: 112 MMHG | DIASTOLIC BLOOD PRESSURE: 72 MMHG | HEART RATE: 70 BPM | TEMPERATURE: 97.6 F | RESPIRATION RATE: 14 BRPM | BODY MASS INDEX: 32.83 KG/M2 | WEIGHT: 255.8 LBS | HEIGHT: 74 IN | OXYGEN SATURATION: 94 %

## 2019-08-13 DIAGNOSIS — H66.011 ACUTE SUPPURATIVE OTITIS MEDIA OF RIGHT EAR WITH SPONTANEOUS RUPTURE OF TYMPANIC MEMBRANE, RECURRENCE NOT SPECIFIED: ICD-10-CM

## 2019-08-13 PROCEDURE — 99213 OFFICE O/P EST LOW 20 MIN: CPT | Performed by: FAMILY MEDICINE

## 2019-08-13 NOTE — LETTER
[facility]  Veronica Gross M.D.  [address]  [place] [address]  Fax: [phone]   Authorization for Release/Disclosure of   Protected Health Information   Name: ALEXSANDER DOHERTY : 1960 SSN: [id number]   Address: [address] [place] [address] Phone:    [phone] (home)    I authorize the entity listed below to release/disclose the PHI below to:   [facility]/Veronica Gross M.D. and Veronica Gross M.D.   Provider or Entity Name:     Address   City, State, Zip   Phone:      Fax:     Reason for request: continuity of care   Information to be released:    [  ] LAST COLONOSCOPY,  including any PATH REPORT and follow-up  [  ] LAST FIT/COLOGUARD RESULT [  ] LAST DEXA  [  ] LAST MAMMOGRAM  [  ] LAST PAP  [  ] LAST LABS [  ] RETINA EXAM REPORT  [  ] IMMUNIZATION RECORDS  [  ] Release all info      [  ] Check here and initial the line next to each item to release ALL health information INCLUDING  _____ Care and treatment for drug and / or alcohol abuse  _____ HIV testing, infection status, or AIDS  _____ Genetic Testing    DATES OF SERVICE OR TIME PERIOD TO BE DISCLOSED: _____________  I understand and acknowledge that:  * This Authorization may be revoked at any time by you in writing, except if your health information has already been used or disclosed.  * Your health information that will be used or disclosed as a result of you signing this authorization could be re-disclosed by the recipient. If this occurs, your re-disclosed health information may no longer be protected by State or Federal laws.  * You may refuse to sign this Authorization. Your refusal will not affect your ability to obtain treatment.  * This Authorization becomes effective upon signing and will  on (date) __________.      If no date is indicated, this Authorization will  one (1) year from the signature date.    Name: Alexsander Doherty    Signature:   Date:     2019       PLEASE FAX REQUESTED  RECORDS BACK TO: [phone]

## 2019-08-13 NOTE — ASSESSMENT & PLAN NOTE
Chronic problem. Seems to finally improving since treating with levaquin po and cipro/dex drops. No further discharge from the ear of the past couple days. Denies any pain, fevers or chills. He has appt scheduled with ENT on Friday of this week.

## 2019-08-13 NOTE — LETTER
[facility]  Veronica Gross M.D.  [address]  [place] [address]  Fax: [phone]   Authorization for Release/Disclosure of   Protected Health Information   Name: ALEXSANDER DOHERTY : 1960 SSN: [id number]   Address: [address] [place] [address] Phone:    [phone] (home)    I authorize the entity listed below to release/disclose the PHI below to:   [facility]/Veronica Gross M.D. and Veronica Gross M.D.   Provider or Entity Name:     Address   City, State, Zip   Phone:      Fax:     Reason for request: continuity of care   Information to be released:    [  ] LAST COLONOSCOPY,  including any PATH REPORT and follow-up  [  ] LAST FIT/COLOGUARD RESULT [  ] LAST DEXA  [  ] LAST MAMMOGRAM  [  ] LAST PAP  [  ] LAST LABS [  ] RETINA EXAM REPORT  [  ] IMMUNIZATION RECORDS  [  ] Release all info      [  ] Check here and initial the line next to each item to release ALL health information INCLUDING  _____ Care and treatment for drug and / or alcohol abuse  _____ HIV testing, infection status, or AIDS  _____ Genetic Testing    DATES OF SERVICE OR TIME PERIOD TO BE DISCLOSED: _____________  I understand and acknowledge that:  * This Authorization may be revoked at any time by you in writing, except if your health information has already been used or disclosed.  * Your health information that will be used or disclosed as a result of you signing this authorization could be re-disclosed by the recipient. If this occurs, your re-disclosed health information may no longer be protected by State or Federal laws.  * You may refuse to sign this Authorization. Your refusal will not affect your ability to obtain treatment.  * This Authorization becomes effective upon signing and will  on (date) __________.      If no date is indicated, this Authorization will  one (1) year from the signature date.    Name: Alexsander Doherty    Signature:   Date:     2019       PLEASE FAX REQUESTED  RECORDS BACK TO: [phone]

## 2019-08-13 NOTE — PROGRESS NOTES
Subjective:     CC: The encounter diagnosis was Acute suppurative otitis media of right ear with spontaneous rupture of tympanic membrane, recurrence not specified.    HPI: Patient is a 59 y.o. male established patient who presents today to f/u on perforated ear drum/infection.      Acute suppurative otitis media of right ear with spontaneous rupture of tympanic membrane  Chronic problem. Seems to finally improving since treating with levaquin po and cipro/dex drops. No further discharge from the ear of the past couple days. Denies any pain, fevers or chills. He has appt scheduled with ENT on Friday of this week.       Past Medical History:   Diagnosis Date   • Depression    • Diabetes (HCC)    • Hypertension        Social History     Tobacco Use   • Smoking status: Never Smoker   • Smokeless tobacco: Never Used   Substance Use Topics   • Alcohol use: No     Comment: Rarely   • Drug use: No       Current Outpatient Medications Ordered in Epic   Medication Sig Dispense Refill   • levoFLOXacin (LEVAQUIN) 500 MG tablet Take 1 Tab by mouth every day for 7 days. 7 Tab 0   • atorvastatin (LIPITOR) 80 MG tablet Take 1 Tab by mouth every day. 30 Tab 5   • metFORMIN (GLUCOPHAGE) 500 MG Tab Take 1 Tab by mouth 2 times a day, with meals. 60 Tab 5   • sertraline (ZOLOFT) 100 MG Tab Take 200 mg by mouth every day.     • buPROPion (WELLBUTRIN) 100 MG Tab Take 150 mg by mouth 2 times a day.     • lisinopril (PRINIVIL) 5 MG Tab Take 5 mg by mouth.     • allopurinol (ZYLOPRIM) 300 MG Tab Take 300 mg by mouth.     • ciprofloxacin/dexamethasone (CIPRODEX) 0.3-0.1 % Suspension Place 4 Drops in ear 2 times a day for 7 days. 1 Bottle 0   • cefdinir (OMNICEF) 300 MG Cap Take 1 Cap by mouth 2 times a day. (Patient not taking: Reported on 8/6/2019) 20 Cap 20   • amoxicillin-clavulanate (AUGMENTIN) 875-125 MG Tab Take one pill twice a day with food for a week (Patient not taking: Reported on 8/6/2019) 14 Tab 0   • clindamycin (CLEOCIN T) 1 %  "Gel Apply small amount to affected skin qhs. 1 Tube 3     No current Epic-ordered facility-administered medications on file.        Allergies:  Patient has no known allergies.    Health Maintenance: Completed    ROS:  Gen: no fevers/chill  Pulm: no sob, no cough        Objective:       Exam:  /72 (BP Location: Right arm, Patient Position: Sitting, BP Cuff Size: Adult)   Pulse 70   Temp 36.4 °C (97.6 °F) (Temporal)   Resp 14   Ht 1.88 m (6' 2\")   Wt 116 kg (255 lb 12.8 oz)   SpO2 94%   BMI 32.84 kg/m²  Body mass index is 32.84 kg/m².    General: Normal appearing. No distress.  HEAD: NCAT  EYES: conjunctiva clear, lids without ptosis, pupils equal and reactive to light  EARS: ears normal shape and contour. L TM appears to be healing, no perforation noted, TM is opaque, not erythematous, no discharge noted in the canal.  MOUTH: normal dentition   Neck:  Normal ROM  Pulmonary: Normal effort. Normal respiratory rate.  Cardiovascular: Well perfused. No LE edema  Neurologic: Grossly normal, no focal deficits  Skin: Warm and dry.  No obvious lesions.  Musculoskeletal: Normal gait and station.   Psych: Normal mood and affect. Alert and oriented x3. Judgment and insight is normal.       Assessment & Plan:     59 y.o. male with the following -     1. Acute suppurative otitis media of right ear with spontaneous rupture of tympanic membrane, recurrence not specified  Chronic problem. Failed cefdinir. Now improving following treatment with levaquin and cipro/dex drops. Given long h/o problems with that ear and h/o perforation requiring patch, referral was sent to ENT. He has an appt scheduled for this Friday.     Return if symptoms worsen or fail to improve.    Please note that this dictation was created using voice recognition software. I have made every reasonable attempt to correct obvious errors, but I expect that there are errors of grammar and possibly content that I did not discover before finalizing the " note.

## 2019-08-13 NOTE — LETTER
[facility]  Veronica Gross M.D.  [address]  [place] [address]  Fax: [phone]   Authorization for Release/Disclosure of   Protected Health Information   Name: ALEXSANDER DOHERTY : 1960 SSN: [id number]   Address: [address] [place] [address] Phone:    [phone] (home)    I authorize the entity listed below to release/disclose the PHI below to:   [facility]/Veronica Gross M.D. and Veronica Gross M.D.   Provider or Entity Name:     Address   City, State, Zip   Phone:      Fax:     Reason for request: continuity of care   Information to be released:    [  ] LAST COLONOSCOPY,  including any PATH REPORT and follow-up  [  ] LAST FIT/COLOGUARD RESULT [  ] LAST DEXA  [  ] LAST MAMMOGRAM  [  ] LAST PAP  [  ] LAST LABS [  ] RETINA EXAM REPORT  [  ] IMMUNIZATION RECORDS  [  ] Release all info      [  ] Check here and initial the line next to each item to release ALL health information INCLUDING  _____ Care and treatment for drug and / or alcohol abuse  _____ HIV testing, infection status, or AIDS  _____ Genetic Testing    DATES OF SERVICE OR TIME PERIOD TO BE DISCLOSED: _____________  I understand and acknowledge that:  * This Authorization may be revoked at any time by you in writing, except if your health information has already been used or disclosed.  * Your health information that will be used or disclosed as a result of you signing this authorization could be re-disclosed by the recipient. If this occurs, your re-disclosed health information may no longer be protected by State or Federal laws.  * You may refuse to sign this Authorization. Your refusal will not affect your ability to obtain treatment.  * This Authorization becomes effective upon signing and will  on (date) __________.      If no date is indicated, this Authorization will  one (1) year from the signature date.    Name: Alexsander Doherty    Signature:   Date:     2019       PLEASE FAX REQUESTED  RECORDS BACK TO: [phone]

## 2019-08-16 ENCOUNTER — PHYSICAL THERAPY (OUTPATIENT)
Dept: PHYSICAL THERAPY | Facility: REHABILITATION | Age: 59
End: 2019-08-16
Attending: FAMILY MEDICINE
Payer: COMMERCIAL

## 2019-08-16 DIAGNOSIS — S86.911D STRAIN OF RIGHT KNEE, SUBSEQUENT ENCOUNTER: ICD-10-CM

## 2019-08-16 PROCEDURE — 97140 MANUAL THERAPY 1/> REGIONS: CPT

## 2019-08-16 PROCEDURE — 97014 ELECTRIC STIMULATION THERAPY: CPT

## 2019-08-16 NOTE — OP THERAPY DAILY TREATMENT
Outpatient Physical Therapy  DAILY TREATMENT     [facility] Physical [address]  [address]  [place] [address]  Phone:  [phone]  Fax:  [phone]    Date: 08/16/2019    Patient: Alexsander Doherty  YOB: 1960  MRN: [id number]     Time Calculation  Start time: 0920  Stop time: 0955 Time Calculation (min): 35 minutes       Chief Complaint: No chief complaint on file.    Visit #: 2    SUBJECTIVE:  Ls pain with activity and few stairs but worse with more than 10 steps.  No more pain with standing    5 steps// Increased pain            Therapeutic Treatments and Modalities:     Therapeutic Treatment and Modalities Summary:   1. Patient agrees to risks and benefits associated with Dry Needling, Written consent signed     2. Patient's skin cleaned and prepped according to protocol    3. DN pes ansirine, vmo, semitendinosis       6. Electrical stimulation (PENS) applied to needles x     10  minutes w/ varying Hz    7. Heat applied to area post treatment x 10 minutes     8. No adverse effects noted post treatment     Time-based treatments/modalities:  Manual therapy minutes (CPT 36700): 15 minutes           ASSESSMENT:   ttp pes and distal semitendinosis    PLAN/RECOMMENDATIONS:   psot cahin strength , DN??

## 2019-08-23 ENCOUNTER — PHYSICAL THERAPY (OUTPATIENT)
Dept: PHYSICAL THERAPY | Facility: REHABILITATION | Age: 59
End: 2019-08-23
Attending: FAMILY MEDICINE
Payer: COMMERCIAL

## 2019-08-23 DIAGNOSIS — S86.911D STRAIN OF RIGHT KNEE, SUBSEQUENT ENCOUNTER: ICD-10-CM

## 2019-08-23 PROCEDURE — 97140 MANUAL THERAPY 1/> REGIONS: CPT

## 2019-08-23 PROCEDURE — 97116 GAIT TRAINING THERAPY: CPT

## 2019-08-23 PROCEDURE — 97014 ELECTRIC STIMULATION THERAPY: CPT

## 2019-08-23 NOTE — OP THERAPY DAILY TREATMENT
Outpatient Physical Therapy  DAILY TREATMENT     [facility] Physical [address].  [address]  [place] [address]  Phone:  [phone]  Fax:  [phone]    Date: 08/23/2019    Patient: Alexsander Doherty  YOB: 1960  MRN: [id number]     Time Calculation  Start time: 0745  Stop time: 0830 Time Calculation (min): 45 minutes       Chief Complaint: No chief complaint on file.    Visit #: 3    SUBJECTIVE:  Better, but still some pain with squats and stairs with more than 7-8 steps            Therapeutic Treatments and Modalities:     Therapeutic Treatment and Modalities Summary:   1. Patient agrees to risks and benefits associated with Dry Needling, Written consent signed     2. Patient's skin cleaned and prepped according to protocol    3. DN : sartorious insertion, pes ansirine, gracilis, semitendinosis       6. Electrical stimulation (PENS) applied to needles x     10  minutes w/ varying Hz    7. Heat applied to area post treatment x 10 minutes     8. No adverse effects noted post treatment   Gait trg with focus on t-plane motion  fitter    Time-based treatments/modalities:  Manual therapy minutes (CPT 69448): 15 minutes  Gait training minutes (CPT 97251): 8 minutes           ASSESSMENT:   ttp pes, proximal Sartorious  distal semitendinosis with decreased pain with steps and squats after treatment  Limited t-plane motion with gait  PLAN/RECOMMENDATIONS:   post chain strength , DN??

## 2019-09-11 ENCOUNTER — PHYSICAL THERAPY (OUTPATIENT)
Dept: PHYSICAL THERAPY | Facility: REHABILITATION | Age: 59
End: 2019-09-11
Attending: FAMILY MEDICINE
Payer: COMMERCIAL

## 2019-09-11 DIAGNOSIS — S86.911D STRAIN OF RIGHT KNEE, SUBSEQUENT ENCOUNTER: ICD-10-CM

## 2019-09-11 PROCEDURE — 97110 THERAPEUTIC EXERCISES: CPT

## 2019-09-11 PROCEDURE — 97014 ELECTRIC STIMULATION THERAPY: CPT

## 2019-09-11 PROCEDURE — 97140 MANUAL THERAPY 1/> REGIONS: CPT

## 2019-09-11 NOTE — OP THERAPY DAILY TREATMENT
Outpatient Physical Therapy  DAILY TREATMENT     [facility] [address]  [address]  [place] [address]  Phone:  [phone]  Fax:  [phone]    Date: 09/11/2019    Patient: Alexsander Doherty  YOB: 1960  MRN: [id number]     Time Calculation  Start time: 0716  Stop time: 0810 Time Calculation (min): 54 minutes       Chief Complaint: No chief complaint on file.    Visit #: 4    SUBJECTIVE:  Much better ..., no pain in the past week.  Slight soreness after a flight or two of stairs.  95% improved.  The only limit that remains is after a flight stairs--squats and dec=scending stairs no longer flares his knee up  Objective: nttp feliciano  Noted trgr pt ttp BF and pes insertion    Lower Extremity Functional Scale Total: 97.5       Therapeutic Treatments and Modalities:     Therapeutic Treatment and Modalities Summary:   Post Chain Strengthening:    Roller marching hamstrings  Ball bridges with alternating leg lifts x10  Ball bridge hamstring curls  X 10     1. Patient agrees to risks and benefits associated with Dry Needling, Written consent signed     2. Patient's skin cleaned and prepped according to protocol    3. DN : semit tendinosis/membranosis and pes insertion      6. Electrical stimulation (PENS) applied to needles x  12  minutes w/ varying Hz    7. Heat applied to area post treatment x 10 minutes     8. No adverse effects noted post treatment       Time-based treatments/modalities:  Manual therapy minutes (CPT 81652): 20 minutes  Therapeutic exercise minutes (CPT 08685): 5 minutes           ASSESSMENT:   90% improved with noted tenderness to palpation semitendinosis  PLAN/RECOMMENDATIONS:   Hold 2-4 weeks for hep progression --d/c in 30 days if no patient contact

## 2019-09-24 ENCOUNTER — IMMUNIZATION (OUTPATIENT)
Dept: OCCUPATIONAL MEDICINE | Facility: CLINIC | Age: 59
End: 2019-09-24

## 2019-09-24 DIAGNOSIS — Z23 NEED FOR VACCINATION: ICD-10-CM

## 2019-09-24 PROCEDURE — 90686 IIV4 VACC NO PRSV 0.5 ML IM: CPT | Performed by: PREVENTIVE MEDICINE

## 2019-09-27 ENCOUNTER — HOSPITAL ENCOUNTER (OUTPATIENT)
Dept: LAB | Facility: MEDICAL CENTER | Age: 59
End: 2019-09-27
Payer: COMMERCIAL

## 2019-09-27 LAB
BDY FAT % MEASURED: 26.5 %
BP DIAS: 87 MMHG
BP SYS: 126 MMHG
CHOLEST SERPL-MCNC: 170 MG/DL (ref 100–199)
DIABETES HTDIA: YES
EST. AVERAGE GLUCOSE BLD GHB EST-MCNC: 166 MG/DL
EVENT NAME HTEVT: NORMAL
FASTING HTFAS: YES
GLUCOSE SERPL-MCNC: 136 MG/DL (ref 65–99)
HBA1C MFR BLD: 7.4 % (ref 0–5.6)
HDLC SERPL-MCNC: 36 MG/DL
HYPERTENSION HTHYP: YES
LDLC SERPL CALC-MCNC: ABNORMAL MG/DL
SCREENING LOC CITY HTCIT: NORMAL
SCREENING LOC STATE HTSTA: NORMAL
SCREENING LOCATION HTLOC: NORMAL
SMOKING HTSMO: NO
SUBSCRIBER ID HTSID: NORMAL
TRIGL SERPL-MCNC: 414 MG/DL (ref 0–149)

## 2019-09-27 PROCEDURE — 80061 LIPID PANEL: CPT

## 2019-09-27 PROCEDURE — S5190 WELLNESS ASSESSMENT BY NONPH: HCPCS

## 2019-09-27 PROCEDURE — 82947 ASSAY GLUCOSE BLOOD QUANT: CPT

## 2019-09-27 PROCEDURE — 83036 HEMOGLOBIN GLYCOSYLATED A1C: CPT

## 2019-09-27 PROCEDURE — 36415 COLL VENOUS BLD VENIPUNCTURE: CPT

## 2019-09-30 PROBLEM — Z78.9 NON-SMOKER: Status: ACTIVE | Noted: 2019-09-30

## 2019-09-30 PROBLEM — G47.33 OSA (OBSTRUCTIVE SLEEP APNEA): Status: ACTIVE | Noted: 2019-09-30

## 2019-09-30 NOTE — PROGRESS NOTES
"CC: \"Sleep apnea\"    HPI:    Mr. Alexsander Doherty is a 59-year-old [facility]  kindly referred by Dr. Veronica Gross M.D. for evaluation of possible obstructive sleep apnea syndrome.    The patient's symptoms began approximately 5 years ago.  In his 50s he began to snore louder and louder.  About 6 years ago he began falling asleep during meetings and while driving.  He rates his problem as 9 out of 10 on the severity scale.    The patient generally goes to bed at 10:30 PM and gets up between 630-7 AM.  He works from 8 AM to 5 PM.  He has a regular bed partner and estimates that his sleep latency is about 1 to 2 minutes.    He may read just prior to turn out the lights and attempting to go to sleep.  He estimates that he wakes up about 2 times each night to use the restroom.  He believes he gets about 7 to 8 hours of sleep per night.    Symptoms include sometimes difficulty awakening and getting out of bed after sleeping, sleepiness during the day, too little sleep at night, tiredness during the day, and loud and disturbing snoring.  He denies restless legs.  There is no history of arm or leg jerking or twitching during sleep.  He may fall asleep as a passenger in a motor vehicle.    No spousal questionnaire completed.  Total Beaver score is a normal 6 out of 24.  Review of his sleep diary showed napping 2 out of 7 days and he awakened feeling tired most mornings      Significant comorbidities and modifying factors include recent otitis media with spontaneous tympanic membrane rupture, obesity, never smoker, gout, type 2 diabetes, kidney stones, depression, hypertension, up to date with flu vaccination, and dyslipidemia.      Patient Active Problem List    Diagnosis Date Noted   • Non-smoker 09/30/2019   • MILLER (obstructive sleep apnea) 09/30/2019   • Acute pain of right knee 08/06/2019   • Great toe pain, left 08/06/2019   • Onychomycosis 08/06/2019   • Dyslipidemia 07/19/2019   • Acute " suppurative otitis media of right ear with spontaneous rupture of tympanic membrane 07/19/2019   • Diabetes mellitus type 2 in obese ([facility]) 06/18/2019   • Chronic idiopathic gout involving toe 06/18/2019   • Essential hypertension 06/18/2019   • Class 1 obesity due to excess calories without serious comorbidity with body mass index (BMI) of 32.0 to 32.9 in adult 06/18/2019   • Recurrent major depressive disorder, in full remission ([facility]) 06/18/2019   • Excessive sleepiness 06/18/2019   • Rosacea 06/18/2019       Past Medical History:   Diagnosis Date   • Depression    • Diabetes ([facility])    • Hypertension    • Kidney stone    • Obesity         Past Surgical History:   Procedure Laterality Date   • ENT SURGERY      (R) ear   • OTHER ORTHOPEDIC SURGERY      Radial Head, (R) wrist   • TONSILLECTOMY         Family History   Problem Relation Age of Onset   • Diabetes Mother    • Heart Disease Mother    • No Known Problems Father        Social History     Socioeconomic History   • Marital status:      Spouse name: Not on file   • Number of children: Not on file   • Years of education: Not on file   • Highest education level: Not on file   Occupational History   • Not on file   Social Needs   • Financial resource strain: Not on file   • Food insecurity:     Worry: Not on file     Inability: Not on file   • Transportation needs:     Medical: Not on file     Non-medical: Not on file   Tobacco Use   • Smoking status: Never Smoker   • Smokeless tobacco: Never Used   Substance and Sexual Activity   • Alcohol use: No     Comment: Rarely   • Drug use: No   • Sexual activity: Never     Comment: , works at an office job (purchasing)   Lifestyle   • Physical activity:     Days per week: Not on file     Minutes per session: Not on file   • Stress: Not on file   Relationships   • Social connections:     Talks on phone: Not on file     Gets together: Not on file     Attends Rastafarian service: Not on file     Active member of  "club or organization: Not on file     Attends meetings of clubs or organizations: Not on file     Relationship status: Not on file   • Intimate partner violence:     Fear of current or ex partner: Not on file     Emotionally abused: Not on file     Physically abused: Not on file     Forced sexual activity: Not on file   Other Topics Concern   • Not on file   Social History Narrative   • Not on file       Current Outpatient Medications   Medication Sig Dispense Refill   • zolpidem (AMBIEN) 5 MG Tab Take 1 Tab by mouth at bedtime as needed for up to 3 doses. Take 1-3 tabs prn 3 Tab 0   • atorvastatin (LIPITOR) 80 MG tablet Take 1 Tab by mouth every day. 30 Tab 5   • metFORMIN (GLUCOPHAGE) 500 MG Tab Take 1 Tab by mouth 2 times a day, with meals. 60 Tab 5   • sertraline (ZOLOFT) 100 MG Tab Take 200 mg by mouth every day.     • buPROPion (WELLBUTRIN) 100 MG Tab Take 150 mg by mouth 2 times a day.     • lisinopril (PRINIVIL) 5 MG Tab Take 5 mg by mouth.     • allopurinol (ZYLOPRIM) 300 MG Tab Take 300 mg by mouth.     • cefdinir (OMNICEF) 300 MG Cap Take 1 Cap by mouth 2 times a day. (Patient not taking: Reported on 8/6/2019) 20 Cap 20   • amoxicillin-clavulanate (AUGMENTIN) 875-125 MG Tab Take one pill twice a day with food for a week (Patient not taking: Reported on 8/6/2019) 14 Tab 0   • clindamycin (CLEOCIN T) 1 % Gel Apply small amount to affected skin qhs. (Patient not taking: Reported on 10/1/2019) 1 Tube 3     No current facility-administered medications for this visit.     \"CURRENT RX\"    ALLERGIES: Patient has no known allergies.    ROS    Constitutional: Denies fever, chills, positive for sweats,  weight loss, positive for fatigue.  Eyes: Denies vision loss, pain, drainage, double vision, wears glasses.  Ears/Nose/Mouth/Throat: Denies earache, difficulty hearing, rhinitis/nasal congestion, injury, recurrent sore throat, persistent hoarseness, decayed teeth/toothaches, ringing or buzzing in the " "ears.  Cardiovascular: Denies chest pain, tightness, palpitations, swelling in legs/feet, fainting, difficulty breathing when lying down but gets better when sitting up.   Respiratory: Denies shortness of breath, cough, sputum, wheezing, painful breathing, coughing up blood.   Sleep: per HPI  Gastrointestinal: Positive for heartburn, diarrhea, constipation  Genitourinary: Denies  blood in urine, discharge, frequent urination.   Musculoskeletal: Denies painful joints, sore muscles, back pain.   Integumentary: Denies rashes, lumps, color changes.   Neurological: Denies frequent headaches,weakness, dizziness.    PHYSICAL EXAM  Obese    /64 (BP Location: Right arm, Patient Position: Sitting, BP Cuff Size: Large adult)   Pulse (!) 59   Resp 16   Ht 1.88 m (6' 2\")   Wt 114.8 kg (253 lb)   SpO2 93%   BMI 32.48 kg/m²   Appearance: Well-nourished, well-developed, no acute distress  Eyes:  PERRLA, EOMI  ENMT: without lesions, deformities;hearing grossly intact; tongue normal, posterior pharynx without erythema or exudate;   Modified Mallampati (AKA Tavera) Score: 1  Neck: Supple, trachea midline, no masses  Respiratory effort:  No intercostal retractions or use of accessory muscles  Lung auscultation:  No wheezes rhonchi rubs or rales  Cardiac: No murmurs, rubs, or gallops; regular rhythm, normal rate; no edema  Abdomen:  No tenderness, no organomegaly.  Obese  Musculoskeletal:  Grossly normal; gait and station normal; digits and nails normal  Skin:  No rashes, petechiae, cyanosis  Neurologic: without focal signs; oriented to person, time, place, and purpose; judgement intact  Psychiatric:  No depression, anxiety, agitation        PROBLEMS:  1. MILLER (obstructive sleep apnea)    - zolpidem (AMBIEN) 5 MG Tab; Take 1 Tab by mouth at bedtime as needed for up to 3 doses. Take 1-3 tabs prn  Dispense: 3 Tab; Refill: 0  - Polysomnography, 4 or More; Future    2. Class 1 obesity due to excess calories without serious " comorbidity with body mass index (BMI) of 32.0 to 32.9 in adult    - OBESITY COUNSELING (No Charge): Patient identified as having weight management issue.  Appropriate orders and counseling given.    3. Essential hypertension      4. Diabetes mellitus type 2 in obese (HCC)      5. Dyslipidemia      6. Non-smoker      PLAN:   The patient has signs and symptoms consistent with obstructive sleep apnea hypopnea syndrome. Will schedule to have a nocturnal polysomnogram using zolpidem to assist with sleep onset and maintenance should the need arise. Will return after the results are available to determine further diagnostic needs and/or treatment options.    The risks of untreated sleep apnea were discussed with the patient at length. Patients with MILLER are at increased risk of cardiovascular disease including coronary artery disease, systemic arterial hypertension, pulmonary arterial hypertension, cardiac arrythmias, and stroke. MILLER patients have an increased risk of motor vehicle accidents, type 2 diabetes, chronic kidney disease, and non-alcoholic liver disease. The patient was advised to avoid driving a motor vehicle when drowsy.    Positive airway pressure, such as CPAP, is considered first-line and preferred therapy for sleep apnea and may reverse both symptoms and risks.    Have advised the patient to follow up with the appropriate healthcare practitioners for all other medical problems and issues.      Return for after sleep study.

## 2019-10-01 ENCOUNTER — SLEEP CENTER VISIT (OUTPATIENT)
Dept: SLEEP MEDICINE | Facility: MEDICAL CENTER | Age: 59
End: 2019-10-01
Payer: COMMERCIAL

## 2019-10-01 VITALS
SYSTOLIC BLOOD PRESSURE: 112 MMHG | DIASTOLIC BLOOD PRESSURE: 64 MMHG | OXYGEN SATURATION: 93 % | HEIGHT: 74 IN | RESPIRATION RATE: 16 BRPM | WEIGHT: 253 LBS | HEART RATE: 59 BPM | BODY MASS INDEX: 32.47 KG/M2

## 2019-10-01 DIAGNOSIS — E66.9 DIABETES MELLITUS TYPE 2 IN OBESE (HCC): ICD-10-CM

## 2019-10-01 DIAGNOSIS — I10 ESSENTIAL HYPERTENSION: ICD-10-CM

## 2019-10-01 DIAGNOSIS — E78.5 DYSLIPIDEMIA: ICD-10-CM

## 2019-10-01 DIAGNOSIS — G47.33 OSA (OBSTRUCTIVE SLEEP APNEA): ICD-10-CM

## 2019-10-01 DIAGNOSIS — E66.09 CLASS 1 OBESITY DUE TO EXCESS CALORIES WITHOUT SERIOUS COMORBIDITY WITH BODY MASS INDEX (BMI) OF 32.0 TO 32.9 IN ADULT: ICD-10-CM

## 2019-10-01 DIAGNOSIS — Z23 NEED FOR INFLUENZA VACCINATION: ICD-10-CM

## 2019-10-01 DIAGNOSIS — E11.69 DIABETES MELLITUS TYPE 2 IN OBESE (HCC): ICD-10-CM

## 2019-10-01 DIAGNOSIS — Z78.9 NON-SMOKER: ICD-10-CM

## 2019-10-01 PROCEDURE — 99244 OFF/OP CNSLTJ NEW/EST MOD 40: CPT | Performed by: INTERNAL MEDICINE

## 2019-10-01 RX ORDER — ZOLPIDEM TARTRATE 5 MG/1
5 TABLET ORAL NIGHTLY PRN
Qty: 3 TAB | Refills: 0 | Status: SHIPPED | OUTPATIENT
Start: 2019-10-01 | End: 2019-11-01

## 2019-10-02 ENCOUNTER — SLEEP STUDY (OUTPATIENT)
Dept: SLEEP MEDICINE | Facility: MEDICAL CENTER | Age: 59
End: 2019-10-02
Attending: INTERNAL MEDICINE
Payer: COMMERCIAL

## 2019-10-02 ENCOUNTER — PHYSICAL THERAPY (OUTPATIENT)
Dept: PHYSICAL THERAPY | Facility: REHABILITATION | Age: 59
End: 2019-10-02
Attending: FAMILY MEDICINE
Payer: COMMERCIAL

## 2019-10-02 DIAGNOSIS — G47.33 OSA (OBSTRUCTIVE SLEEP APNEA): ICD-10-CM

## 2019-10-02 DIAGNOSIS — S86.911D STRAIN OF RIGHT KNEE, SUBSEQUENT ENCOUNTER: ICD-10-CM

## 2019-10-02 PROCEDURE — 97110 THERAPEUTIC EXERCISES: CPT

## 2019-10-02 PROCEDURE — 95811 POLYSOM 6/>YRS CPAP 4/> PARM: CPT | Performed by: INTERNAL MEDICINE

## 2019-10-02 NOTE — OP THERAPY DAILY TREATMENT
Outpatient Physical Therapy  DAILY TREATMENT     [facility] [address].  [address]  [place] [address]  Phone:  [phone]  Fax:  [phone]    Date: 10/02/2019    Patient: Alexsander Doherty  YOB: 1960  MRN: [id number]     Time Calculation  Start time: 0718  Stop time: 0750 Time Calculation (min): 32 minutes       Chief Complaint: No chief complaint on file.    Visit #: 5    SUBJECTIVE:  significantly better but still having some pain with up stairs on occasion            Therapeutic Treatments and Modalities:     Therapeutic Treatment and Modalities Summary: Gait trg with t-plane pelvic motion and arm swing with a conscious effort to put feet closer together  Stand with feet together  S/l running man--hep  Ball wall squats--hep/h/o        Time-based treatments/modalities:  Therapeutic exercise minutes (CPT 73368): 30 minutes           ASSESSMENT:   Noted significantly weak bilateral lateral hip strength and  Wide JULIO  PLAN/RECOMMENDATIONS:   1/wk x 2 weeks

## 2019-10-03 NOTE — PROCEDURES
Clinical Comments:    The patient underwent a split night polysomnogram with a CPAP titration using the standard montage for measurement of paramaters of sleep, respiratory events, movement abnormalities, heart rate and rhythm.  A Microphone was used to monitior snoring.  Interpretation:  Study start time was 08:32:19 PM.  Total recording time was 4h 1.5m (241 minutes) with a total sleep time of 2h 16.5m (136 minutes) resulting in a sleep efficiency of 56.52%.  Sleep latency from the start fo the study was 86 minutes minutes and REM latency from sleep onset was 00 minutes minutes.  Respiratory:   There were 12 apneas in total consisting of 12 obstructive apneas, 0 mixed apneas, and 0 central apneas.  There were 124 hypopneas in total.  The apnea index was 5.27 per hour and the hypopnea index was 54.51 per hour.  The overall AHI was 59.8, with a REM AHI of 0.00, and a supine AHI of 59.78.  Limb Movements:  There were a total of 231 periodic leg movements, of which 28 were PLMS arousals.  This resulted in a PLMS index of 101.5 and a PLMS arousal index of 12.3  Oximetry:  The mean SaO2 was 92.0% for the diagnostic portion of the study, with a minimum SaO2 of 75.0%.    Treatment:  Interpretation:  Treatment recording time was 5h 15.0m (315 minutes) with a total sleep time of 4h 58.0m (298 minutes) resulting in a sleep efficiency of 94.6%.    Sleep latency from the start of treatment was 01 minutes minutes and REM latency from sleep onset was 1h 39.0m minutes.    The patient had 140 arousals in total for an arousal index of 28.2.  Respiratory:   There were 21 apneas in total consisting of  12 obstructive apneas, 9 central apneas, and 0 mixed apneas for an apnea index of 4.23.    The patient had 110 hypopneas in total, which resulted in a hypopnea index of 22.15.    The overall AHI was 26.38, with a REM AHI of 37.56, and a supine AHI of 26.38.     Limb Movements:  There were a total of 67 periodic leg movements, of which  7 were PLMS arousals.  This resulted in a PLMS index of 13.5 and a PLMS arousal index of 1.4.  Oximetry:  The mean SaO2 during treatment was 93.0%, with a minimum oxygen saturation of 81.0%.  CPAP was tried from 5 to 14cm H2O.  BIPAP was tried from 10/6 to 12/8cm H2O.    RECORDING TECHNIQUE:       After the scalp was prepared, gold plated electrodes were applied to the scalp according to the International 10-20 System. EEG (electroencephalogram) was continuously monitored from the O1-M2, O2-M1, C3-M2, C4-M1, F3-M2, and F4-M1.   EOGs (electrooculograms) were monitored by electrodes placed at the left and right outer canthi.  Chin EMG (electromyogram) was monitored by electrodes placed on the mentalis and sub-mentalis muscles.  Nasal and oral airflow were monitored using a triple port thermocouple as well as oronasal pressure transducer.  Respiratory effort was measured by inductive plethysmography technology employing abdominal and thoracic belts.  Blood oxygen saturation and pulse were monitored by pulse oximetry.  Heart rhythm was monitored by surface electrocardiogram.  Leg EMG was studied using surface electrodes placed on left and right anterior tibialis.  A microphone was used to monitor tracheal sounds and snoring.  Body position was monitored and documented by technician observation      SUMMARY:    This was an overnight diagnostic polysomnogram with a subsequent positive airway pressure titration, also known as a split- night study.  The patient chose to use a large Eson mask with heated humidification.    During the diagnostic phase the total recording time was 241 minutes, the sleep period time was 145 minutes, and the total sleep time was 136 minutes.  The patient's sleep efficiency was 36.5 to % which is reduced.  The patient experienced 0 REM period(s).    The sleep stage durations revealed 19.0 minutes of wake after sleep onset (WASO), 38 minutes of N1 sleep, 97 minutes of N2 sleep, 1 minutes of N3  sleep, and 0 minutes of REM.    The latency to sleep was 19 minutes which is normal.  The latency to REM was not applicable as the patient did not achieve REM.  Severe sleep fragmentation occurred.  The arousal index was 42.  The Total Limb Movement (isolated) Index was 5.3, the Limb Movement with Arousal Index was 11.9, and the PLM Series Index was 99.3.    The patient experienced 0 central and 12 obstructive apneas, 0 central and 124 obstructive hypopneas, 136 apneas and hypopneas, and 0 RERAS.  The apnea hypopnea index was 59.8, the RDI was 59.8, the mean event duration was 20.0 seconds, and the longest event lasted 29.9 seconds.  The REM index was 0 and the supine index was 59.8.  The apnea hypopnea index represents severe obstructive sleep apnea hypopnea syndrome.    The alan saturation during sleep was 80 % and the patient spent 12.8 % of the recording with saturations less than or equal to 90%.    During sleep, the minimum heart rate was 51 bpm, the mean heart rate was 55 bpm, and the maximum heart rate was 61 bpm.    Once the patient met the split-night protocol, the technician performed a positive airway pressure titration.  The technician initiated treatment with CPAP set at 4 cmH2O and increased the pressure to a maximum of 14 cmH2O. the apnea hypopnea index failed to normalize on any tested CPAP pressure.  Therefore the patient was tried on bilevel with IPAP strain from 10-12 and EPAP going from 6-8.  The apnea hypopnea index failed to normalize in any tested bilevel pressure as well.        ASSESSMENT:    Severe obstructive sleep apnea hypopnea - AHI 59.8  Significant nocturnal desaturation - alan saturation 80 % - saturations <90% for 12.8% of the diagnostic recording  Incomplete positive airway pressure titration          RECOMMENDATION:    Recommend a dedicated full night positive airway pressure titration starting with bilevel 12/8 cm water

## 2019-10-04 ENCOUNTER — SLEEP STUDY (OUTPATIENT)
Dept: SLEEP MEDICINE | Facility: MEDICAL CENTER | Age: 59
End: 2019-10-04
Attending: INTERNAL MEDICINE
Payer: COMMERCIAL

## 2019-10-04 ENCOUNTER — SLEEP CENTER VISIT (OUTPATIENT)
Dept: SLEEP MEDICINE | Facility: MEDICAL CENTER | Age: 59
End: 2019-10-04
Payer: COMMERCIAL

## 2019-10-04 VITALS
BODY MASS INDEX: 32.47 KG/M2 | WEIGHT: 253 LBS | HEIGHT: 74 IN | OXYGEN SATURATION: 94 % | RESPIRATION RATE: 16 BRPM | DIASTOLIC BLOOD PRESSURE: 70 MMHG | SYSTOLIC BLOOD PRESSURE: 116 MMHG | HEART RATE: 67 BPM

## 2019-10-04 DIAGNOSIS — G47.33 OSA (OBSTRUCTIVE SLEEP APNEA): ICD-10-CM

## 2019-10-04 PROCEDURE — 95811 POLYSOM 6/>YRS CPAP 4/> PARM: CPT | Performed by: FAMILY MEDICINE

## 2019-10-04 PROCEDURE — 99213 OFFICE O/P EST LOW 20 MIN: CPT | Performed by: INTERNAL MEDICINE

## 2019-10-04 RX ORDER — ZOLPIDEM TARTRATE 5 MG/1
5 TABLET ORAL NIGHTLY PRN
Qty: 2 TAB | Refills: 0 | Status: SHIPPED | OUTPATIENT
Start: 2019-10-04 | End: 2019-10-06

## 2019-10-04 NOTE — PROGRESS NOTES
Chief Complaint   Patient presents with   • Results     SS re       HPI: This patient is a pleasant 59 y.o. Male,  for [employer], who returns to discuss sleep study results.  He has had snoring and daytime hypersomnolence for the past 5 years.  Polysomnography in the sleep laboratory showed severe MILLER, AHI 60/h with desaturations to a alan of 80%.    He was titrated on CPAP and bilevel therapy with incomplete titration however significant improvement in sleep quality and REM rebound noted.    Past Medical History:   Diagnosis Date   • Depression    • Diabetes (HCC)    • Hypertension    • Kidney stone    • Obesity        Social History     Socioeconomic History   • Marital status:      Spouse name: Not on file   • Number of children: Not on file   • Years of education: Not on file   • Highest education level: Not on file   Occupational History   • Not on file   Social Needs   • Financial resource strain: Not on file   • Food insecurity:     Worry: Not on file     Inability: Not on file   • Transportation needs:     Medical: Not on file     Non-medical: Not on file   Tobacco Use   • Smoking status: Never Smoker   • Smokeless tobacco: Never Used   Substance and Sexual Activity   • Alcohol use: No     Comment: Rarely   • Drug use: No   • Sexual activity: Never     Comment: , works at an office job (purchasing)   Lifestyle   • Physical activity:     Days per week: Not on file     Minutes per session: Not on file   • Stress: Not on file   Relationships   • Social connections:     Talks on phone: Not on file     Gets together: Not on file     Attends Yarsanism service: Not on file     Active member of club or organization: Not on file     Attends meetings of clubs or organizations: Not on file     Relationship status: Not on file   • Intimate partner violence:     Fear of current or ex partner: Not on file     Emotionally abused: Not on file     Physically abused: Not on file     Forced  sexual activity: Not on file   Other Topics Concern   • Not on file   Social History Narrative   • Not on file       Family History   Problem Relation Age of Onset   • Diabetes Mother    • Heart Disease Mother    • No Known Problems Father        Current Outpatient Medications on File Prior to Visit   Medication Sig Dispense Refill   • atorvastatin (LIPITOR) 80 MG tablet Take 1 Tab by mouth every day. 30 Tab 5   • metFORMIN (GLUCOPHAGE) 500 MG Tab Take 1 Tab by mouth 2 times a day, with meals. 60 Tab 5   • sertraline (ZOLOFT) 100 MG Tab Take 200 mg by mouth every day.     • buPROPion (WELLBUTRIN) 100 MG Tab Take 150 mg by mouth 2 times a day.     • lisinopril (PRINIVIL) 5 MG Tab Take 5 mg by mouth.     • allopurinol (ZYLOPRIM) 300 MG Tab Take 300 mg by mouth.     • zolpidem (AMBIEN) 5 MG Tab Take 1 Tab by mouth at bedtime as needed for up to 3 doses. Take 1-3 tabs prn (Patient not taking: Reported on 10/4/2019) 3 Tab 0   • cefdinir (OMNICEF) 300 MG Cap Take 1 Cap by mouth 2 times a day. (Patient not taking: Reported on 8/6/2019) 20 Cap 20   • amoxicillin-clavulanate (AUGMENTIN) 875-125 MG Tab Take one pill twice a day with food for a week (Patient not taking: Reported on 8/6/2019) 14 Tab 0   • clindamycin (CLEOCIN T) 1 % Gel Apply small amount to affected skin qhs. (Patient not taking: Reported on 10/1/2019) 1 Tube 3     No current facility-administered medications on file prior to visit.        Allergies: Patient has no known allergies.    ROS:   Constitutional: Denies fevers, chills, night sweats, fatigue or weight loss  Eyes: Denies vision loss, pain, drainage, double vision  Ears, Nose, Throat: Denies earache, difficulty hearing, tinnitus, nasal congestion, hoarseness  Cardiovascular: Denies chest pain, tightness, palpitations, orthopnea or edema  Respiratory: Denies shortness of breath, cough, wheezing, hemoptysis  Sleep: +daytime sleepiness, snoring, apneas, denies insomnia, morning headaches  GI: Denies  "heartburn, dysphagia, nausea, abdominal pain, diarrhea or constipation  : Denies frequent urination, hematuria, discharge or painful urination  Musculoskeletal: Denies back pain, painful joints, sore muscles  Neurological: Denies weakness or headaches  Skin: No rashes    /70 (BP Location: Left arm, Patient Position: Sitting, BP Cuff Size: Adult)   Pulse 67   Resp 16   Ht 1.88 m (6' 2\")   Wt 114.8 kg (253 lb)   SpO2 94%     Physical Exam:  Appearance: Well-nourished, well-developed, in no acute distress  HEENT: Normocephalic, atraumatic, white sclera, pupils equal, Mallampati 1  Neck: No masses  Respiratory: no intercostal retractions or accessory muscle use  Abdomen: Nondistended  Gait: Normal  Digits: No cyanosis  Motor: No tremors  Orientation: Oriented to time, person and place    Diagnosis:  1. MILLER (obstructive sleep apnea)  Polysomnography Titration    zolpidem (AMBIEN) 5 MG Tab   2. BMI 32.0-32.9,adult         Plan:  The patient has severe MILLER, warranting BiPAP therapy for treatment.    We will arrange for BiPAP titration polysomnogram starting at 12/8 cm of water.  Patient's body mass index is 32.48 kg/m². Exercise and nutrition counseling were performed at this visit.  Return for after sleep study.      "

## 2019-10-07 NOTE — PROCEDURES
The patient underwent an overnight Bipap titration using the standard montage for measurement of parameters of sleep, respiratory events, movement abnormalities, and heart rate and rhythm.   A microphone was used to monitor snoring.    Interpretation:  Study start time was 10:06:39 PM. Diagnostic recording time was 7h 45.5m with a total sleep time of 6h 22.5m resulting in a sleep efficiency of 82.17%%. Sleep latency from the start of the study was 06 minutes and the latency from sleep to REM was 329 minutes.In total,247 arousals were scored for an arousal index of 38.7. Sleep stages showed decreased SE, increased WASO of 76 min, decreased REM and normal N3 sleep.    Respiratory:  There were a total of 37 apneas consisting of 0 obstructive apneas, 0 mixed apneas, and 37 central apneas. A total of 96 hypopneas were scored.The apnea index was 5.80 per hour and the hypopnea index was 15.06 per hour resulting in an overall AHI of 20.86.AHI during rem was 7.4 and AHI while supine was 21.65.  27% of the apneas were central apneas     Oximetry:  There was a mean oxygen saturation of 95.0% with a minimum oxygen saturation of 79.0%. Time spent with oxygen saturations below 89% was 14.6 minutes.    Cardiac:  The highest heart rate seen while awake was 73 BPM while the highest heart rate during sleep was 78 BPM with an average sleeping heart rate of 57 BPM.    Limb Movements:  There were a total of 438 PLMs during sleep, of which 73 were PLMS arousals. This resulted in a PLMS index of 68.7 and a PLMS arousal index of 11.5.    BiPAP was tried from 12/8cm H2O to 24/19cm H2O.    CPAP Titration:  The PAP titration was initiated with BiPAP 12/8 cm of water and the pressure which was slowly titrated up in an attempt to eliminate sleep disordered breathing and snoring. The final pressure tested during the study was  BiPAP 24/19 cm water and at this final pressure the patient was observed in the supine but not REM sleep stage. The  apnea hypopnea index improved to 2.3 per hour and O2 alan 93%. The average O2 stauration was 95%. He spent 3 min of sleep time below 89% O2 saturation. The patient utilized medium quattro mask with heated humidification. The CPAP was well-tolerated and there were minimal air leaks. No supplemental oxygen was required.    Impression:  1.  Obstructive sleep apnea   2.  PLMS       Recommendations:  I recommend  BiPAP 24/19 cm with medium quattro mask. Recommended 30 day compliance download to assess the efficacy of the recommended pressure and compliance for further outpatient monitoring and management of CPAP therapy. In some cases alternative treatment options may prove effective in resolving sleep apnea and these options include upper airway surgery, the use of a dental orthotic or weight loss and positional therapy. Clinical correlation is required. In general patients with sleep apnea are advised to avoid alcohol and sedatives and to not operate a motor vehicle while drowsy and are at a greater risk for cardiovascular disease.

## 2019-10-11 ENCOUNTER — PHYSICAL THERAPY (OUTPATIENT)
Dept: PHYSICAL THERAPY | Facility: REHABILITATION | Age: 59
End: 2019-10-11
Attending: FAMILY MEDICINE
Payer: COMMERCIAL

## 2019-10-11 DIAGNOSIS — S86.911D STRAIN OF RIGHT KNEE, SUBSEQUENT ENCOUNTER: ICD-10-CM

## 2019-10-11 PROCEDURE — 97110 THERAPEUTIC EXERCISES: CPT

## 2019-10-11 NOTE — OP THERAPY DAILY TREATMENT
Outpatient Physical Therapy  DAILY TREATMENT     [facility] [address].  [address]  [place] [address]  Phone:  [phone]  Fax:  [phone]    Date: 10/11/2019    Patient: Alexsander Doherty  YOB: 1960  MRN: [id number]     Time Calculation  Start time: 0725  Stop time: 0755 Time Calculation (min): 30 minutes       Chief Complaint: No chief complaint on file.    Visit #: 6    SUBJECTIVE:  significantly better but still having some pain with elliptical machine but not worse after 40'            Therapeutic Treatments and Modalities:     Therapeutic Treatment and Modalities Summary: Gait trg with t-plane pelvic motion and arm swing with a conscious effort to put feet closer together  Stand with feet together  S/l running man w/ #2--hep  Ball wall squats--hep--reviewed  Toy soldiers x#4 x 1'  Reviewed  Tree analogy, pain analogy        Time-based treatments/modalities:  Therapeutic exercise minutes (CPT 88896): 30 minutes       ASSESSMENT:   Continued lateral hip muscle weakness but less overall pain w/ only occasional bouts of pain  PLAN/RECOMMENDATIONS:   1/wk x 2 weeks--hold 2 weeks to progress HEP

## 2019-10-21 ENCOUNTER — SLEEP CENTER VISIT (OUTPATIENT)
Dept: SLEEP MEDICINE | Facility: MEDICAL CENTER | Age: 59
End: 2019-10-21
Payer: COMMERCIAL

## 2019-10-21 VITALS
HEIGHT: 74 IN | BODY MASS INDEX: 32.6 KG/M2 | DIASTOLIC BLOOD PRESSURE: 80 MMHG | WEIGHT: 254 LBS | SYSTOLIC BLOOD PRESSURE: 126 MMHG | OXYGEN SATURATION: 93 % | HEART RATE: 67 BPM

## 2019-10-21 DIAGNOSIS — G47.33 OSA (OBSTRUCTIVE SLEEP APNEA): ICD-10-CM

## 2019-10-21 PROCEDURE — 99213 OFFICE O/P EST LOW 20 MIN: CPT | Performed by: INTERNAL MEDICINE

## 2019-10-21 ASSESSMENT — ENCOUNTER SYMPTOMS
NEUROLOGICAL NEGATIVE: 1
MUSCULOSKELETAL NEGATIVE: 1
GASTROINTESTINAL NEGATIVE: 1
PSYCHIATRIC NEGATIVE: 1
RESPIRATORY NEGATIVE: 1
CARDIOVASCULAR NEGATIVE: 1
EYES NEGATIVE: 1

## 2019-10-21 NOTE — ASSESSMENT & PLAN NOTE
AHI was 59.8,  and a supine AHI of 59.78 O2 alan 75%  C/w Severe sleep apnea  Had treatment emergent central with treatment but improved with BIPAP 29/19 with AHI 2.3/hr and o2 alan 93%  Initial sleep assessment  _x___ Discussed the pathophysiology of sleep disordered breathing including risks for hypertension, heart attack, and stroke   Also discussed treatment options including CPAP, an oral appliance, and surgical intervention.   __x___ Discussed weight control program with goal of achieving and maintaining ideal body weight.   __x___ Polysomnography was discussed and ordered   __X_       Prescription of BIPAP 29/19 and follow up with compliance report in 4-6 weeks  __x___Discussed weight control programs with goal of achieving and maintaining ideal body weight   __x___Discussed the diagnosis , management and pathophysiology MILLER   _x___Discussed the risks associated with driving and operating equipment while drowsy. The patient verbalized an understanding of this and agreed to take appropriate measures to eliminate these risks.   __x___Discussed the cardiovascular and neuropsychiatric risks of untreated MILLER; including but not limited to: HTN, DM, MI, ASCVD, CVA, CHF, traffic accidents   __x___ Discussed positive airway pressure (PAP) as the preferred therapy for severe MILLER;    .

## 2019-10-21 NOTE — PROGRESS NOTES
Sleep Clinic follow up    Date of Service: 10/21/2019    Reason for follow up:  Follow-Up (MILLER. Last seen on 10/04/19) and Results (SS titration 10/04/19)      Problem List Items Addressed This Visit     MILLER (obstructive sleep apnea)      AHI was 59.8,  and a supine AHI of 59.78 O2 alan 75%  C/w Severe sleep apnea  Had treatment emergent central with treatment but improved with BIPAP 29/19 with AHI 2.3/hr and o2 alan 93%  Initial sleep assessment  _x___ Discussed the pathophysiology of sleep disordered breathing including risks for hypertension, heart attack, and stroke   Also discussed treatment options including CPAP, an oral appliance, and surgical intervention.   __x___ Discussed weight control program with goal of achieving and maintaining ideal body weight.   __x___ Polysomnography was discussed and ordered   __X_       Prescription of BIPAP 29/19 and follow up with compliance report in 4-6 weeks  __x___Discussed weight control programs with goal of achieving and maintaining ideal body weight   __x___Discussed the diagnosis , management and pathophysiology MILLER   _x___Discussed the risks associated with driving and operating equipment while drowsy. The patient verbalized an understanding of this and agreed to take appropriate measures to eliminate these risks.   __x___Discussed the cardiovascular and neuropsychiatric risks of untreated MILLER; including but not limited to: HTN, DM, MI, ASCVD, CVA, CHF, traffic accidents   __x___ Discussed positive airway pressure (PAP) as the preferred therapy for severe MILLER;    .           Relevant Orders    DME BiPAP            History of Present Illness: Alexsander Doherty is a 59 y.o. male with a past medical history of    Sleep study done 10/2/19 and titration study done on 10/4  The overall AHI was 59.8, with a REM AHI of 0.00, and a supine AHI of 59.78 O2 alan 75%  Had treatment emergent central with treatment but improved with BIPAP 29/19 with AHI 2.3/hr and o2 alan  93%    Initially did reviewed titration study and corrected results to patient    Review of Systems   Constitutional: Positive for malaise/fatigue.   HENT: Negative.    Eyes: Negative.    Respiratory: Negative.    Cardiovascular: Negative.    Gastrointestinal: Negative.    Genitourinary: Negative.    Musculoskeletal: Negative.    Skin: Negative.    Neurological: Negative.    Endo/Heme/Allergies: Negative.    Psychiatric/Behavioral: Negative.        Current Outpatient Medications on File Prior to Visit   Medication Sig Dispense Refill   • atorvastatin (LIPITOR) 80 MG tablet Take 1 Tab by mouth every day. 30 Tab 5   • metFORMIN (GLUCOPHAGE) 500 MG Tab Take 1 Tab by mouth 2 times a day, with meals. 60 Tab 5   • sertraline (ZOLOFT) 100 MG Tab Take 200 mg by mouth every day.     • buPROPion (WELLBUTRIN) 100 MG Tab Take 150 mg by mouth 2 times a day.     • lisinopril (PRINIVIL) 5 MG Tab Take 5 mg by mouth.     • allopurinol (ZYLOPRIM) 300 MG Tab Take 300 mg by mouth.     • zolpidem (AMBIEN) 5 MG Tab Take 1 Tab by mouth at bedtime as needed for up to 3 doses. Take 1-3 tabs prn (Patient not taking: Reported on 10/4/2019) 3 Tab 0   • cefdinir (OMNICEF) 300 MG Cap Take 1 Cap by mouth 2 times a day. (Patient not taking: Reported on 8/6/2019) 20 Cap 20   • amoxicillin-clavulanate (AUGMENTIN) 875-125 MG Tab Take one pill twice a day with food for a week (Patient not taking: Reported on 8/6/2019) 14 Tab 0   • clindamycin (CLEOCIN T) 1 % Gel Apply small amount to affected skin qhs. (Patient not taking: Reported on 10/1/2019) 1 Tube 3     No current facility-administered medications on file prior to visit.        Social History     Tobacco Use   • Smoking status: Never Smoker   • Smokeless tobacco: Never Used   Substance Use Topics   • Alcohol use: No     Comment: Rarely   • Drug use: No        Past Medical History:   Diagnosis Date   • Depression    • Diabetes (HCC)    • Hypertension    • Kidney stone    • Obesity        Past  "Surgical History:   Procedure Laterality Date   • ENT SURGERY      (R) ear   • OTHER ORTHOPEDIC SURGERY      Radial Head, (R) wrist   • TONSILLECTOMY         Allergies: Patient has no known allergies.    Family History   Problem Relation Age of Onset   • Diabetes Mother    • Heart Disease Mother    • No Known Problems Father        Vitals:    10/21/19 1401   Height: 1.88 m (6' 2\")   Weight: 115.2 kg (254 lb)   Weight % change since last entry.: 0 %   BP: 126/80   Pulse: 67   BMI (Calculated): 32.61   O2 sat % room air: 93 %       Physical Examination  Physical Exam   Constitutional: He is oriented to person, place, and time and well-developed, well-nourished, and in no distress. No distress.   HENT:   Head: Normocephalic and atraumatic.   Right Ear: External ear normal.   Left Ear: External ear normal.   Nose: Nose normal.   Mouth/Throat: Oropharynx is clear and moist. No oropharyngeal exudate.   Crowded oropharynx   Eyes: Pupils are equal, round, and reactive to light. Conjunctivae and EOM are normal.   Neck: Normal range of motion. Neck supple. No JVD present. No tracheal deviation present. No thyromegaly present.   Cardiovascular: Normal rate, regular rhythm, normal heart sounds and intact distal pulses. Exam reveals no gallop and no friction rub.   No murmur heard.  Pulmonary/Chest: Effort normal and breath sounds normal. No stridor. No respiratory distress. He has no wheezes. He has no rales. He exhibits no tenderness.   Abdominal: Soft. Bowel sounds are normal. He exhibits no distension and no mass.   Musculoskeletal: Normal range of motion. He exhibits no edema or deformity.   Lymphadenopathy:     He has no cervical adenopathy.   Neurological: He is alert and oriented to person, place, and time. No cranial nerve deficit. He exhibits normal muscle tone. Gait normal. Coordination normal. GCS score is 15.   Skin: No rash noted. He is not diaphoretic.   Psychiatric: Mood, memory, affect and judgment normal.    "          Gilson Boswell MD MPH [facility]  [facility]

## 2019-10-24 ENCOUNTER — HOSPITAL ENCOUNTER (OUTPATIENT)
Dept: LAB | Facility: MEDICAL CENTER | Age: 59
End: 2019-10-24
Attending: FAMILY MEDICINE
Payer: COMMERCIAL

## 2019-10-24 DIAGNOSIS — E78.5 DYSLIPIDEMIA: ICD-10-CM

## 2019-10-24 LAB
CHOLEST SERPL-MCNC: 142 MG/DL (ref 100–199)
HDLC SERPL-MCNC: 35 MG/DL
LDLC SERPL CALC-MCNC: 46 MG/DL
TRIGL SERPL-MCNC: 305 MG/DL (ref 0–149)

## 2019-10-24 PROCEDURE — 80061 LIPID PANEL: CPT

## 2019-10-24 PROCEDURE — 36415 COLL VENOUS BLD VENIPUNCTURE: CPT

## 2019-10-31 ENCOUNTER — APPOINTMENT (OUTPATIENT)
Dept: MEDICAL GROUP | Facility: MEDICAL CENTER | Age: 59
End: 2019-10-31
Payer: COMMERCIAL

## 2019-11-01 ENCOUNTER — APPOINTMENT (OUTPATIENT)
Dept: PHYSICAL THERAPY | Facility: REHABILITATION | Age: 59
End: 2019-11-01
Attending: FAMILY MEDICINE
Payer: COMMERCIAL

## 2019-11-04 ENCOUNTER — OFFICE VISIT (OUTPATIENT)
Dept: MEDICAL GROUP | Facility: MEDICAL CENTER | Age: 59
End: 2019-11-04
Payer: COMMERCIAL

## 2019-11-04 VITALS
OXYGEN SATURATION: 94 % | WEIGHT: 254.41 LBS | SYSTOLIC BLOOD PRESSURE: 132 MMHG | DIASTOLIC BLOOD PRESSURE: 70 MMHG | HEART RATE: 69 BPM | BODY MASS INDEX: 32.65 KG/M2 | HEIGHT: 74 IN | TEMPERATURE: 96.7 F

## 2019-11-04 DIAGNOSIS — E11.9 DIABETES MELLITUS WITHOUT COMPLICATION (HCC): ICD-10-CM

## 2019-11-04 DIAGNOSIS — E78.5 DYSLIPIDEMIA: ICD-10-CM

## 2019-11-04 DIAGNOSIS — E66.09 CLASS 1 OBESITY DUE TO EXCESS CALORIES WITHOUT SERIOUS COMORBIDITY WITH BODY MASS INDEX (BMI) OF 32.0 TO 32.9 IN ADULT: ICD-10-CM

## 2019-11-04 DIAGNOSIS — E66.9 DIABETES MELLITUS TYPE 2 IN OBESE (HCC): ICD-10-CM

## 2019-11-04 DIAGNOSIS — R09.81 SINUS CONGESTION: ICD-10-CM

## 2019-11-04 DIAGNOSIS — E11.69 DIABETES MELLITUS TYPE 2 IN OBESE (HCC): ICD-10-CM

## 2019-11-04 DIAGNOSIS — H65.191 ACUTE MIDDLE EAR EFFUSION, RIGHT: ICD-10-CM

## 2019-11-04 DIAGNOSIS — G47.33 OSA (OBSTRUCTIVE SLEEP APNEA): ICD-10-CM

## 2019-11-04 LAB
HBA1C MFR BLD: 7 % (ref 0–5.6)
INT CON NEG: NEGATIVE
INT CON POS: POSITIVE

## 2019-11-04 PROCEDURE — 99214 OFFICE O/P EST MOD 30 MIN: CPT | Performed by: FAMILY MEDICINE

## 2019-11-04 PROCEDURE — 83036 HEMOGLOBIN GLYCOSYLATED A1C: CPT | Performed by: FAMILY MEDICINE

## 2019-11-04 RX ORDER — FLUTICASONE PROPIONATE 50 MCG
1 SPRAY, SUSPENSION (ML) NASAL DAILY
Qty: 16 G | Refills: 2 | Status: SHIPPED | OUTPATIENT
Start: 2019-11-04

## 2019-11-04 RX ORDER — ATORVASTATIN CALCIUM 40 MG/1
40 TABLET, FILM COATED ORAL DAILY
Qty: 100 TAB | Refills: 3 | Status: SHIPPED | OUTPATIENT
Start: 2019-11-04

## 2019-11-04 NOTE — ASSESSMENT & PLAN NOTE
New problem.  The patient was recently diagnosed via sleep study.  He was diagnosed with severe MILLER.  Order placed for BiPAP.  He has yet to get this delivered to his home.  He is well aware that he needs to be working diligently on weight loss.  He is interested in the health improvement program.  He does report that he is been exercising regularly.  Getting 230min of ellipitical weekly.

## 2019-11-05 ENCOUNTER — APPOINTMENT (OUTPATIENT)
Dept: MEDICAL GROUP | Facility: MEDICAL CENTER | Age: 59
End: 2019-11-05
Payer: COMMERCIAL

## 2019-11-05 NOTE — ASSESSMENT & PLAN NOTE
Chronic problem.  LDL now 46 on the atorvastatin 80 mg.  Denies any side effects.  Triglycerides still elevated.

## 2019-11-05 NOTE — ASSESSMENT & PLAN NOTE
Chronic problem.  The patient states that he has been unable to breathe out of his nose recently.  Not currently using Flonase.  He was evaluated by ENT, .  Unclear what all they covered at her last visit as I do not have a consult note.

## 2019-11-05 NOTE — ASSESSMENT & PLAN NOTE
New problem.  Noted on exam today.  Patient reports fullness in the ear as well as itching in the bilateral ears.  Denies any pain.  Denies any fevers or chills.  He was seen by ENT.

## 2019-11-05 NOTE — PROGRESS NOTES
Subjective:     CC: Diagnoses of MILLER (obstructive sleep apnea), Class 1 obesity due to excess calories without serious comorbidity with body mass index (BMI) of 32.0 to 32.9 in adult, Diabetes mellitus type 2 in obese (HCC), Diabetes mellitus without complication (HCC), Dyslipidemia, Acute middle ear effusion, right, and Sinus congestion were pertinent to this visit.    HPI: Patient is a 59 y.o. male established patient who presents today to follow-up.      MILLER (obstructive sleep apnea)  New problem.  The patient was recently diagnosed via sleep study.  He was diagnosed with severe MILLER.  Order placed for BiPAP.  He has yet to get this delivered to his home.  He is well aware that he needs to be working diligently on weight loss.  He is interested in the health improvement program.  He does report that he is been exercising regularly.  Getting 230min of ellipitical weekly.    Acute middle ear effusion, right  New problem.  Noted on exam today.  Patient reports fullness in the ear as well as itching in the bilateral ears.  Denies any pain.  Denies any fevers or chills.  He was seen by ENT.    Sinus congestion  Chronic problem.  The patient states that he has been unable to breathe out of his nose recently.  Not currently using Flonase.  He was evaluated by ENT, .  Unclear what all they covered at her last visit as I do not have a consult note.    Diabetes mellitus type 2 in obese (HCC)  Chronic problem.  Currently on metformin 500 mg twice daily.  A1c today was 7.0%.  Patient has been able to lose weight.    Dyslipidemia  Chronic problem.  LDL now 46 on the atorvastatin 80 mg.  Denies any side effects.  Triglycerides still elevated.      Past Medical History:   Diagnosis Date   • Depression    • Diabetes (HCC)    • Hypertension    • Kidney stone    • Obesity        Social History     Tobacco Use   • Smoking status: Never Smoker   • Smokeless tobacco: Never Used   Substance Use Topics   • Alcohol use: No      "Comment: Rarely   • Drug use: No       Current Outpatient Medications Ordered in Epic   Medication Sig Dispense Refill   • atorvastatin (LIPITOR) 40 MG Tab Take 1 Tab by mouth every day. 100 Tab 3   • fluticasone (FLONASE) 50 MCG/ACT nasal spray Spray 1 Spray in nose every day. 16 g 2   • metFORMIN (GLUCOPHAGE) 500 MG Tab Take 1 Tab by mouth 2 times a day, with meals. 60 Tab 5   • sertraline (ZOLOFT) 100 MG Tab Take 200 mg by mouth every day.     • buPROPion (WELLBUTRIN) 100 MG Tab Take 150 mg by mouth 2 times a day.     • clindamycin (CLEOCIN T) 1 % Gel Apply small amount to affected skin qhs. 1 Tube 3   • lisinopril (PRINIVIL) 5 MG Tab Take 5 mg by mouth.     • allopurinol (ZYLOPRIM) 300 MG Tab Take 300 mg by mouth.       No current Epic-ordered facility-administered medications on file.        Allergies:  Patient has no known allergies.    Health Maintenance: Completed    ROS:  Pulm: no sob, no cough  CV: no chest pain, no palpitations        Objective:       Exam:  /70 (BP Location: Right arm, Patient Position: Sitting, BP Cuff Size: Large adult)   Pulse 69   Temp 35.9 °C (96.7 °F) (Temporal)   Ht 1.88 m (6' 2\")   Wt 115.4 kg (254 lb 6.6 oz)   SpO2 94%   BMI 32.66 kg/m²  Body mass index is 32.66 kg/m².      General: Normal appearing. No distress.  HEAD: NCAT  EYES: conjunctiva clear, lids without ptosis, pupils equal  and reactive to light  EARS: ears normal shape and contour.  Right TM with significant effusion, opaque, no erythema.  Left TM normal.  MOUTH: oropharynx is without erythema, edema or exudates.   Neck: Supple without masses. Thyroid is not enlarged. Normal ROM  Pulmonary: Clear to ausculation.  Normal effort. No rales, ronchi, or wheezing.  Cardiovascular: Regular rate and rhythm, no murmur. No LE edema  Neurologic: Grossly normal, no focal deficits  Lymph: No cervical or supraclavicular lymph nodes are palpable  Skin: Warm and dry.  No obvious lesions.  Musculoskeletal: Normal gait and " station.   Psych: Normal mood and affect. Alert and oriented x3. Judgment and insight is normal.       Labs: 9/27/19 Results reviewed and discussed with the patient, questions answered.    Assessment & Plan:     59 y.o. male with the following -     1. MILLER (obstructive sleep apnea)  New problem.  Diagnosed with severe sleep apnea.  Patient is awaiting BiPAP to be delivered.  Following with pulmonology in December.    2. Class 1 obesity due to excess calories without serious comorbidity with body mass index (BMI) of 32.0 to 32.9 in adult  Chronic problem, uncontrolled.  Referral placed to health improvement program today.  Discussed diet and exercise today.  Patient is well aware that losing weight would improve his MILLER.  - REFERRAL TO [facility] IMPROVEMENT PROGRAMS (HIP) Services Requested: [facility]; Reason for Referral? BMI>30; Reason for Visit: Overweight/Obesity    3. Diabetes mellitus type 2 in obese (HCC)  Chronic problem.  A1c 7.0 today.  As above, referral placed to health improvement program.  - REFERRAL TO [facility] IMPROVEMENT PROGRAMS (HIP) Services Requested: [facility]; Reason for Referral? BMI>30; Reason for Visit: Overweight/Obesity  - atorvastatin (LIPITOR) 40 MG Tab; Take 1 Tab by mouth every day.  Dispense: 100 Tab; Refill: 3  - Comp Metabolic Panel; Future  - CBC WITH DIFFERENTIAL; Future  - POCT Hemoglobin A1C    4. Dyslipidemia  Chronic problem.  Plan to reduce statin back to 40 mg in the LDL is down to 46 and no significant improvement in triglycerides.  Given the triglycerides are less than 500 plan to hold off on starting fenofibrate.  Patient is going to work on diet for now.  Order placed for repeat lipid panel in 6 months.  - atorvastatin (LIPITOR) 40 MG Tab; Take 1 Tab by mouth every day.  Dispense: 100 Tab; Refill: 3  - Lipid Profile; Future    5. Acute middle ear effusion, right  New problem.  Patient recently  had right otitis media.  That had cleared, however, now has significant effusion on the right.  No erythema.  No significant pain.  However, having fullness and itching.  Advised patient to schedule a follow-up appointment with the ENT.  Also advised Flonase for now.  No need for antibiotic at this point.    6. Sinus congestion  Chronic problem.  Refill placed for Flonase.  Advised patient to schedule an appointment with his ENT.  - fluticasone (FLONASE) 50 MCG/ACT nasal spray; Spray 1 Spray in nose every day.  Dispense: 16 g; Refill: 2    Return in about 6 months (around 5/4/2020), or if symptoms worsen or fail to improve.    Please note that this dictation was created using voice recognition software. I have made every reasonable attempt to correct obvious errors, but I expect that there are errors of grammar and possibly content that I did not discover before finalizing the note.

## 2019-11-05 NOTE — ASSESSMENT & PLAN NOTE
Chronic problem.  Currently on metformin 500 mg twice daily.  A1c today was 7.0%.  Patient has been able to lose weight.

## 2019-11-15 ENCOUNTER — APPOINTMENT (OUTPATIENT)
Dept: PHYSICAL THERAPY | Facility: REHABILITATION | Age: 59
End: 2019-11-15
Attending: FAMILY MEDICINE
Payer: COMMERCIAL

## 2019-12-26 ENCOUNTER — TELEPHONE (OUTPATIENT)
Dept: PHYSICAL THERAPY | Facility: REHABILITATION | Age: 59
End: 2019-12-26

## 2019-12-26 NOTE — OP THERAPY DISCHARGE SUMMARY
Outpatient Physical Therapy  DISCHARGE SUMMARY NOTE      [address].  [address]  [place] [address]  Phone:  [phone]  Fax:  [phone]    Date of Visit: 12/26/2019    Patient: Alexsander Doherty  YOB: 1960  MRN: [id number]     Referring Provider: No referring provider defined for this encounter.   Referring Diagnosis No admission diagnoses are documented for this encounter.     Physical Therapy Occurrence Codes    Date of onset of impairment:  4/11/19   Date physical therapy care plan established or reviewed:  8/9/19   Date physical therapy treatment started:  8/9/19          Functional Assessment Used        Your patient is being discharged from Physical Therapy with the following comments:   · Goals partially met  Visit #: 6     SUBJECTIVE:(last visit 10/11/19)  Patient reported the he was significantly better but still having some pain with elliptical machine but not worse after 40'        ASSESSMENT:   Continued lateral hip muscle weakness but less overall pain w/ only occasional bouts of pain  PLAN/RECOMMENDATIONS:   No patient contact since  10 / 11  /19  .  Patient is independent with his HEP and is being discharged from therapy at this time.    Sav Hodge, PT, DPT, OCS    Date: 12/26/2019